# Patient Record
Sex: FEMALE | Race: WHITE | Employment: OTHER | URBAN - METROPOLITAN AREA
[De-identification: names, ages, dates, MRNs, and addresses within clinical notes are randomized per-mention and may not be internally consistent; named-entity substitution may affect disease eponyms.]

---

## 2017-07-25 PROBLEM — K57.92 DIVERTICULITIS: Status: ACTIVE | Noted: 2017-07-25

## 2017-07-25 PROBLEM — M79.7 FIBROMYALGIA: Status: ACTIVE | Noted: 2017-07-25

## 2017-07-25 PROBLEM — K44.9 HIATAL HERNIA: Status: ACTIVE | Noted: 2017-07-25

## 2019-09-16 ENCOUNTER — HOSPITAL ENCOUNTER (OUTPATIENT)
Age: 64
Discharge: HOME | End: 2019-09-18

## 2019-09-16 PROCEDURE — 88307 TISSUE EXAM BY PATHOLOGIST: CPT

## 2020-03-02 ENCOUNTER — APPOINTMENT (OUTPATIENT)
Dept: CT IMAGING | Age: 65
End: 2020-03-02
Payer: MEDICARE

## 2020-03-02 ENCOUNTER — HOSPITAL ENCOUNTER (EMERGENCY)
Age: 65
Discharge: HOME | End: 2020-03-02
Attending: EMERGENCY MEDICINE
Payer: MEDICARE

## 2020-03-02 VITALS
HEIGHT: 62 IN | TEMPERATURE: 98.2 F | SYSTOLIC BLOOD PRESSURE: 145 MMHG | OXYGEN SATURATION: 93 % | HEART RATE: 92 BPM | WEIGHT: 166 LBS | DIASTOLIC BLOOD PRESSURE: 66 MMHG | BODY MASS INDEX: 30.55 KG/M2 | RESPIRATION RATE: 16 BRPM

## 2020-03-02 LAB
ALBUMIN SERPL-MCNC: 4.6 G/DL (ref 3.5–5.2)
ALP BLD-CCNC: 115 U/L (ref 35–104)
ALT SERPL-CCNC: 24 U/L (ref 0–32)
ANION GAP SERPL CALCULATED.3IONS-SCNC: 15 MMOL/L (ref 7–16)
AST SERPL-CCNC: 21 U/L (ref 0–31)
BASOPHILS ABSOLUTE: 0.05 E9/L (ref 0–0.2)
BASOPHILS RELATIVE PERCENT: 0.5 % (ref 0–2)
BILIRUB SERPL-MCNC: 0.5 MG/DL (ref 0–1.2)
BUN BLDV-MCNC: 11 MG/DL (ref 8–23)
CALCIUM SERPL-MCNC: 9.5 MG/DL (ref 8.6–10.2)
CHLORIDE BLD-SCNC: 104 MMOL/L (ref 98–107)
CO2: 21 MMOL/L (ref 22–29)
CREAT SERPL-MCNC: 0.8 MG/DL (ref 0.5–1)
EOSINOPHILS ABSOLUTE: 0.01 E9/L (ref 0.05–0.5)
EOSINOPHILS RELATIVE PERCENT: 0.1 % (ref 0–6)
GFR AFRICAN AMERICAN: >60
GFR NON-AFRICAN AMERICAN: >60 ML/MIN/1.73
GLUCOSE BLD-MCNC: 123 MG/DL (ref 74–99)
HCT VFR BLD CALC: 46.6 % (ref 34–48)
HEMOGLOBIN: 14.9 G/DL (ref 11.5–15.5)
IMMATURE GRANULOCYTES #: 0.04 E9/L
IMMATURE GRANULOCYTES %: 0.4 % (ref 0–5)
LACTIC ACID: 1.2 MMOL/L (ref 0.5–2.2)
LYMPHOCYTES ABSOLUTE: 1.09 E9/L (ref 1.5–4)
LYMPHOCYTES RELATIVE PERCENT: 10.2 % (ref 20–42)
MCH RBC QN AUTO: 26.3 PG (ref 26–35)
MCHC RBC AUTO-ENTMCNC: 32 % (ref 32–34.5)
MCV RBC AUTO: 82.2 FL (ref 80–99.9)
MONOCYTES ABSOLUTE: 0.51 E9/L (ref 0.1–0.95)
MONOCYTES RELATIVE PERCENT: 4.8 % (ref 2–12)
NEUTROPHILS ABSOLUTE: 9 E9/L (ref 1.8–7.3)
NEUTROPHILS RELATIVE PERCENT: 84 % (ref 43–80)
PDW BLD-RTO: 14.8 FL (ref 11.5–15)
PLATELET # BLD: 351 E9/L (ref 130–450)
PMV BLD AUTO: 10.3 FL (ref 7–12)
POTASSIUM SERPL-SCNC: 4.1 MMOL/L (ref 3.5–5)
RBC # BLD: 5.67 E12/L (ref 3.5–5.5)
SEDIMENTATION RATE, ERYTHROCYTE: 7 MM/HR (ref 0–20)
SODIUM BLD-SCNC: 140 MMOL/L (ref 132–146)
TOTAL PROTEIN: 8.2 G/DL (ref 6.4–8.3)
WBC # BLD: 10.7 E9/L (ref 4.5–11.5)

## 2020-03-02 PROCEDURE — 96372 THER/PROPH/DIAG INJ SC/IM: CPT

## 2020-03-02 PROCEDURE — 85651 RBC SED RATE NONAUTOMATED: CPT

## 2020-03-02 PROCEDURE — 70450 CT HEAD/BRAIN W/O DYE: CPT

## 2020-03-02 PROCEDURE — 96374 THER/PROPH/DIAG INJ IV PUSH: CPT

## 2020-03-02 PROCEDURE — 6370000000 HC RX 637 (ALT 250 FOR IP): Performed by: PHYSICIAN ASSISTANT

## 2020-03-02 PROCEDURE — 96375 TX/PRO/DX INJ NEW DRUG ADDON: CPT

## 2020-03-02 PROCEDURE — 70496 CT ANGIOGRAPHY HEAD: CPT

## 2020-03-02 PROCEDURE — 83605 ASSAY OF LACTIC ACID: CPT

## 2020-03-02 PROCEDURE — 99284 EMERGENCY DEPT VISIT MOD MDM: CPT

## 2020-03-02 PROCEDURE — 85025 COMPLETE CBC W/AUTO DIFF WBC: CPT

## 2020-03-02 PROCEDURE — 6360000002 HC RX W HCPCS: Performed by: PHYSICIAN ASSISTANT

## 2020-03-02 PROCEDURE — 6360000004 HC RX CONTRAST MEDICATION: Performed by: RADIOLOGY

## 2020-03-02 PROCEDURE — 80053 COMPREHEN METABOLIC PANEL: CPT

## 2020-03-02 RX ORDER — METOCLOPRAMIDE HYDROCHLORIDE 5 MG/ML
10 INJECTION INTRAMUSCULAR; INTRAVENOUS ONCE
Status: COMPLETED | OUTPATIENT
Start: 2020-03-02 | End: 2020-03-02

## 2020-03-02 RX ORDER — HYDROCODONE BITARTRATE AND ACETAMINOPHEN 5; 325 MG/1; MG/1
1 TABLET ORAL ONCE
Status: COMPLETED | OUTPATIENT
Start: 2020-03-02 | End: 2020-03-02

## 2020-03-02 RX ORDER — DIPHENHYDRAMINE HYDROCHLORIDE 50 MG/ML
25 INJECTION INTRAMUSCULAR; INTRAVENOUS ONCE
Status: COMPLETED | OUTPATIENT
Start: 2020-03-02 | End: 2020-03-02

## 2020-03-02 RX ORDER — DEXAMETHASONE SODIUM PHOSPHATE 10 MG/ML
10 INJECTION INTRAMUSCULAR; INTRAVENOUS ONCE
Status: COMPLETED | OUTPATIENT
Start: 2020-03-02 | End: 2020-03-02

## 2020-03-02 RX ORDER — ACETAMINOPHEN 500 MG
1000 TABLET ORAL ONCE
Status: COMPLETED | OUTPATIENT
Start: 2020-03-02 | End: 2020-03-02

## 2020-03-02 RX ORDER — PREDNISONE 20 MG/1
40 TABLET ORAL DAILY
Qty: 10 TABLET | Refills: 0 | Status: ON HOLD | OUTPATIENT
Start: 2020-03-02 | End: 2020-03-11 | Stop reason: HOSPADM

## 2020-03-02 RX ORDER — OXCARBAZEPINE 150 MG/1
150 TABLET, FILM COATED ORAL 2 TIMES DAILY
Qty: 60 TABLET | Refills: 3 | Status: ON HOLD | OUTPATIENT
Start: 2020-03-02 | End: 2020-03-11 | Stop reason: SDUPTHER

## 2020-03-02 RX ORDER — HYDROCODONE BITARTRATE AND ACETAMINOPHEN 5; 325 MG/1; MG/1
1 TABLET ORAL EVERY 6 HOURS PRN
Qty: 12 TABLET | Refills: 0 | Status: ON HOLD | OUTPATIENT
Start: 2020-03-02 | End: 2020-03-11 | Stop reason: HOSPADM

## 2020-03-02 RX ADMIN — ACETAMINOPHEN 1000 MG: 500 TABLET ORAL at 12:43

## 2020-03-02 RX ADMIN — IOPAMIDOL 80 ML: 755 INJECTION, SOLUTION INTRAVENOUS at 15:11

## 2020-03-02 RX ADMIN — DEXAMETHASONE SODIUM PHOSPHATE 10 MG: 10 INJECTION, SOLUTION INTRAMUSCULAR; INTRAVENOUS at 14:46

## 2020-03-02 RX ADMIN — HYDROCODONE BITARTRATE AND ACETAMINOPHEN 1 TABLET: 5; 325 TABLET ORAL at 16:27

## 2020-03-02 RX ADMIN — DIPHENHYDRAMINE HYDROCHLORIDE 25 MG: 50 INJECTION, SOLUTION INTRAMUSCULAR; INTRAVENOUS at 13:35

## 2020-03-02 RX ADMIN — METOCLOPRAMIDE 10 MG: 5 INJECTION, SOLUTION INTRAMUSCULAR; INTRAVENOUS at 13:35

## 2020-03-02 ASSESSMENT — PAIN SCALES - GENERAL
PAINLEVEL_OUTOF10: 10
PAINLEVEL_OUTOF10: 5
PAINLEVEL_OUTOF10: 7
PAINLEVEL_OUTOF10: 5

## 2020-03-02 ASSESSMENT — PAIN - FUNCTIONAL ASSESSMENT: PAIN_FUNCTIONAL_ASSESSMENT: PREVENTS OR INTERFERES SOME ACTIVE ACTIVITIES AND ADLS

## 2020-03-02 ASSESSMENT — PAIN DESCRIPTION - PAIN TYPE
TYPE: ACUTE PAIN

## 2020-03-02 ASSESSMENT — PAIN DESCRIPTION - LOCATION
LOCATION: HEAD;EYE
LOCATION: EYE;HEAD
LOCATION: EYE;HEAD
LOCATION: HEAD;EYE

## 2020-03-02 ASSESSMENT — PAIN DESCRIPTION - ORIENTATION
ORIENTATION: RIGHT

## 2020-03-02 ASSESSMENT — PAIN DESCRIPTION - FREQUENCY: FREQUENCY: CONTINUOUS

## 2020-03-02 ASSESSMENT — PAIN DESCRIPTION - DESCRIPTORS: DESCRIPTORS: OTHER (COMMENT)

## 2020-03-02 NOTE — ED PROVIDER NOTES
ED Attending  CC: Ximena       Department of Emergency Medicine   ED  Provider Note  Admit Date/RoomTime: 3/2/2020 12:44 PM  ED Room: 15/15   Chief Complaint:   Headache (Started late friday \"like an electric shock\" that comes and goes. States this morning unable to open eyes. Recent tx for possible sinus infection.  )    History of Present Illness   Source of history provided by:  patient. History/Exam Limitations: none. Nate Remy is a 72 y.o. old female who has a past medical hx of:   Past Medical History:   Diagnosis Date    Chronic fatigue     Brooklynn Montgomery infection     Fibromyalgia     Hiatal hernia     presents to the emergency department by private vehicle, for complaint of sided headache intermittently for the past couple days. Patient describes it as sharp, stabbing and states it is above her right eyebrow. Patient states it lasted a few seconds-minutes. Pt states she pain is making her be unable to open her eyes. Patient is being treated for a sinus infection with Augmentin right now. Denies fever/chills, vision change, dizziness, chest pain, dyspnea, abdominal pain, NVD, numbness/weakness. ROS    Pertinent positives and negatives are stated within HPI, all other systems reviewed and are negative. Past Surgical History:   Procedure Laterality Date    LEEP  09/16/2019    WISDOM TOOTH EXTRACTION     Social History:  reports that she has never smoked. She has never used smokeless tobacco. She reports that she does not drink alcohol or use drugs. Family History: family history includes Breast Cancer in her maternal aunt and maternal grandmother; Colon Cancer in her paternal grandmother; [place] in her maternal grandfather; Other in her mother.    Allergies: Sulfa antibiotics    Physical Exam           ED Triage Vitals [03/02/20 1226]   BP Temp Temp Source Pulse Resp SpO2 Height Weight   (!) 142/86 98.2 °F (36.8 °C) Oral 104 16 94 % 5' 2\" (1.575 m) 166 lb (75.3 kg)    Oxygen Saturation

## 2020-03-02 NOTE — ED NOTES
FIRST PROVIDER CONTACT ASSESSMENT NOTE      Department of Emergency Medicine   ED  First Provider Note   3/2/20  12:30 PM    Chief Complaint: Headache (Started late [de-identified] \"like an electric shock\" that comes and goes. States this morning unable to open eyes. Recent tx for possible sinus infection.  )      History of Present Illness:    Lj Quiroz is a 72 y.o. female who presents to the ED by private car for headache. Patient states she is having electric shock above her right eye that comes and goes. Patient states she is unable to open her eyes with some drainage. Patient has history of sinus affection for which she is been on antibiotics. Patient states she went to her doctor this morning who told her to come right here. Patient is also been taking Sudafed. Patient states she is tried Advil for the pain. Focused Screening Exam:  Constitutional:  Alert, appears stated age and is in no distress.       *ALLERGIES*     Sulfa antibiotics     ED Triage Vitals [03/02/20 1226]   BP Temp Temp Source Pulse Resp SpO2 Height Weight   (!) 142/86 98.2 °F (36.8 °C) Oral 104 16 94 % 5' 2\" (1.575 m) 166 lb (75.3 kg)        Initial Plan of Care:  Initiate Treatment-Testing, Proceed toTreatment Area When Bed Available for ED Attending/MLP to Continue Care    -----------------END OF FIRST PROVIDER CONTACT ASSESSMENT NOTE--------------  Electronically signed by Heather Galarza PA-C   DD: 3/2/20       Heather Galarza PA-C  03/02/20 6699

## 2020-03-04 ENCOUNTER — HOSPITAL ENCOUNTER (INPATIENT)
Age: 65
LOS: 6 days | Discharge: HOME | DRG: 074 | End: 2020-03-11
Attending: EMERGENCY MEDICINE | Admitting: INTERNAL MEDICINE
Payer: MEDICARE

## 2020-03-04 PROBLEM — K44.9 HIATAL HERNIA: Status: RESOLVED | Noted: 2017-07-25 | Resolved: 2020-03-04

## 2020-03-04 PROBLEM — M79.2 NEURALGIA AND NEURITIS: Status: ACTIVE | Noted: 2020-03-04

## 2020-03-04 PROBLEM — G50.0 TRIGEMINAL NEURALGIA PAIN: Status: ACTIVE | Noted: 2020-03-04

## 2020-03-04 PROBLEM — G50.9 TRIGEMINAL NEUROPATHY: Status: ACTIVE | Noted: 2020-03-04

## 2020-03-04 PROBLEM — G50.9 TRIGEMINAL NEUROPATHY: Status: RESOLVED | Noted: 2020-03-04 | Resolved: 2020-03-04

## 2020-03-04 PROBLEM — R51.9 HEADACHE: Status: ACTIVE | Noted: 2020-03-04

## 2020-03-04 LAB
ALBUMIN SERPL-MCNC: 4.4 G/DL (ref 3.5–5.2)
ALP BLD-CCNC: 102 U/L (ref 35–104)
ALT SERPL-CCNC: 20 U/L (ref 0–32)
ANION GAP SERPL CALCULATED.3IONS-SCNC: 14 MMOL/L (ref 7–16)
AST SERPL-CCNC: 20 U/L (ref 0–31)
BILIRUB SERPL-MCNC: 0.3 MG/DL (ref 0–1.2)
BUN BLDV-MCNC: 16 MG/DL (ref 8–23)
CALCIUM SERPL-MCNC: 9.4 MG/DL (ref 8.6–10.2)
CHLORIDE BLD-SCNC: 104 MMOL/L (ref 98–107)
CO2: 22 MMOL/L (ref 22–29)
CREAT SERPL-MCNC: 0.7 MG/DL (ref 0.5–1)
GFR AFRICAN AMERICAN: >60
GFR NON-AFRICAN AMERICAN: >60 ML/MIN/1.73
GLUCOSE BLD-MCNC: 147 MG/DL (ref 74–99)
HCT VFR BLD CALC: 46.1 % (ref 34–48)
HEMOGLOBIN: 14.7 G/DL (ref 11.5–15.5)
MCH RBC QN AUTO: 25.8 PG (ref 26–35)
MCHC RBC AUTO-ENTMCNC: 31.9 % (ref 32–34.5)
MCV RBC AUTO: 80.9 FL (ref 80–99.9)
PDW BLD-RTO: 14.9 FL (ref 11.5–15)
PLATELET # BLD: 400 E9/L (ref 130–450)
PMV BLD AUTO: 10.2 FL (ref 7–12)
POTASSIUM SERPL-SCNC: 4.2 MMOL/L (ref 3.5–5)
RBC # BLD: 5.7 E12/L (ref 3.5–5.5)
SEDIMENTATION RATE, ERYTHROCYTE: 15 MM/HR (ref 0–20)
SODIUM BLD-SCNC: 140 MMOL/L (ref 132–146)
TOTAL PROTEIN: 7.7 G/DL (ref 6.4–8.3)
WBC # BLD: 12.9 E9/L (ref 4.5–11.5)

## 2020-03-04 PROCEDURE — 96374 THER/PROPH/DIAG INJ IV PUSH: CPT

## 2020-03-04 PROCEDURE — 6360000002 HC RX W HCPCS: Performed by: EMERGENCY MEDICINE

## 2020-03-04 PROCEDURE — 85027 COMPLETE CBC AUTOMATED: CPT

## 2020-03-04 PROCEDURE — G0378 HOSPITAL OBSERVATION PER HR: HCPCS

## 2020-03-04 PROCEDURE — 85651 RBC SED RATE NONAUTOMATED: CPT

## 2020-03-04 PROCEDURE — 99284 EMERGENCY DEPT VISIT MOD MDM: CPT

## 2020-03-04 PROCEDURE — 96375 TX/PRO/DX INJ NEW DRUG ADDON: CPT

## 2020-03-04 PROCEDURE — 80053 COMPREHEN METABOLIC PANEL: CPT

## 2020-03-04 RX ORDER — MORPHINE SULFATE 2 MG/ML
2 INJECTION, SOLUTION INTRAMUSCULAR; INTRAVENOUS EVERY 4 HOURS PRN
Status: DISCONTINUED | OUTPATIENT
Start: 2020-03-04 | End: 2020-03-05

## 2020-03-04 RX ORDER — ONDANSETRON 2 MG/ML
4 INJECTION INTRAMUSCULAR; INTRAVENOUS ONCE
Status: COMPLETED | OUTPATIENT
Start: 2020-03-04 | End: 2020-03-04

## 2020-03-04 RX ORDER — POLYETHYLENE GLYCOL 3350 17 G/17G
17 POWDER, FOR SOLUTION ORAL DAILY PRN
Status: DISCONTINUED | OUTPATIENT
Start: 2020-03-04 | End: 2020-03-11 | Stop reason: HOSPADM

## 2020-03-04 RX ORDER — HYDROCODONE BITARTRATE AND ACETAMINOPHEN 5; 325 MG/1; MG/1
1 TABLET ORAL EVERY 6 HOURS PRN
Status: DISCONTINUED | OUTPATIENT
Start: 2020-03-04 | End: 2020-03-05

## 2020-03-04 RX ORDER — ONDANSETRON 2 MG/ML
4 INJECTION INTRAMUSCULAR; INTRAVENOUS EVERY 6 HOURS PRN
Status: DISCONTINUED | OUTPATIENT
Start: 2020-03-04 | End: 2020-03-11 | Stop reason: HOSPADM

## 2020-03-04 RX ORDER — AMOXICILLIN 500 MG/1
500 CAPSULE ORAL 2 TIMES DAILY
Status: ON HOLD | COMMUNITY
End: 2020-03-11 | Stop reason: HOSPADM

## 2020-03-04 RX ORDER — OXCARBAZEPINE 300 MG/1
150 TABLET, FILM COATED ORAL 2 TIMES DAILY
Status: DISCONTINUED | OUTPATIENT
Start: 2020-03-04 | End: 2020-03-05

## 2020-03-04 RX ORDER — ACETAMINOPHEN 325 MG/1
650 TABLET ORAL EVERY 6 HOURS PRN
Status: DISCONTINUED | OUTPATIENT
Start: 2020-03-04 | End: 2020-03-11 | Stop reason: HOSPADM

## 2020-03-04 RX ORDER — PANTOPRAZOLE SODIUM 40 MG/1
40 TABLET, DELAYED RELEASE ORAL
Status: DISCONTINUED | OUTPATIENT
Start: 2020-03-05 | End: 2020-03-05 | Stop reason: SDUPTHER

## 2020-03-04 RX ORDER — METHYLPREDNISOLONE SODIUM SUCCINATE 40 MG/ML
40 INJECTION, POWDER, LYOPHILIZED, FOR SOLUTION INTRAMUSCULAR; INTRAVENOUS ONCE
Status: COMPLETED | OUTPATIENT
Start: 2020-03-04 | End: 2020-03-05

## 2020-03-04 RX ORDER — SODIUM CHLORIDE 0.9 % (FLUSH) 0.9 %
10 SYRINGE (ML) INJECTION EVERY 12 HOURS SCHEDULED
Status: DISCONTINUED | OUTPATIENT
Start: 2020-03-04 | End: 2020-03-11 | Stop reason: HOSPADM

## 2020-03-04 RX ORDER — PROMETHAZINE HYDROCHLORIDE 25 MG/1
12.5 TABLET ORAL EVERY 6 HOURS PRN
Status: DISCONTINUED | OUTPATIENT
Start: 2020-03-04 | End: 2020-03-10

## 2020-03-04 RX ORDER — SODIUM CHLORIDE 0.9 % (FLUSH) 0.9 %
10 SYRINGE (ML) INJECTION PRN
Status: DISCONTINUED | OUTPATIENT
Start: 2020-03-04 | End: 2020-03-11 | Stop reason: HOSPADM

## 2020-03-04 RX ORDER — ACETAMINOPHEN 650 MG/1
650 SUPPOSITORY RECTAL EVERY 6 HOURS PRN
Status: DISCONTINUED | OUTPATIENT
Start: 2020-03-04 | End: 2020-03-11 | Stop reason: HOSPADM

## 2020-03-04 RX ADMIN — ONDANSETRON HYDROCHLORIDE 4 MG: 2 SOLUTION INTRAMUSCULAR; INTRAVENOUS at 20:17

## 2020-03-04 RX ADMIN — HYDROMORPHONE HYDROCHLORIDE 1 MG: 1 INJECTION, SOLUTION INTRAMUSCULAR; INTRAVENOUS; SUBCUTANEOUS at 20:17

## 2020-03-04 ASSESSMENT — PAIN SCALES - GENERAL
PAINLEVEL_OUTOF10: 5
PAINLEVEL_OUTOF10: 7
PAINLEVEL_OUTOF10: 6

## 2020-03-04 ASSESSMENT — PAIN DESCRIPTION - FREQUENCY: FREQUENCY: CONTINUOUS

## 2020-03-04 ASSESSMENT — PAIN DESCRIPTION - LOCATION: LOCATION: HEAD;EYE

## 2020-03-04 ASSESSMENT — ENCOUNTER SYMPTOMS
COUGH: 0
BLOOD IN STOOL: 0
TROUBLE SWALLOWING: 0
CHEST TIGHTNESS: 0
VOMITING: 0
BACK PAIN: 0
NAUSEA: 0
RHINORRHEA: 0
DIARRHEA: 0
WHEEZING: 0
SHORTNESS OF BREATH: 0
EYE PAIN: 0
ABDOMINAL PAIN: 0

## 2020-03-04 ASSESSMENT — PAIN DESCRIPTION - PROGRESSION: CLINICAL_PROGRESSION: NOT CHANGED

## 2020-03-04 ASSESSMENT — PAIN DESCRIPTION - DESCRIPTORS: DESCRIPTORS: OTHER (COMMENT)

## 2020-03-04 ASSESSMENT — PAIN DESCRIPTION - PAIN TYPE: TYPE: ACUTE PAIN

## 2020-03-04 ASSESSMENT — PAIN - FUNCTIONAL ASSESSMENT: PAIN_FUNCTIONAL_ASSESSMENT: PREVENTS OR INTERFERES SOME ACTIVE ACTIVITIES AND ADLS

## 2020-03-04 ASSESSMENT — PAIN DESCRIPTION - ORIENTATION: ORIENTATION: RIGHT

## 2020-03-04 ASSESSMENT — PAIN DESCRIPTION - ONSET: ONSET: ON-GOING

## 2020-03-05 PROBLEM — G50.0 TRIGEMINAL NEURALGIA: Status: ACTIVE | Noted: 2020-03-05

## 2020-03-05 LAB
BACTERIA: ABNORMAL /HPF
BILIRUBIN URINE: NEGATIVE
BLOOD, URINE: NEGATIVE
C-REACTIVE PROTEIN: 0.3 MG/DL (ref 0–0.4)
CLARITY: CLEAR
COLOR: YELLOW
GLUCOSE URINE: NEGATIVE MG/DL
KETONES, URINE: ABNORMAL MG/DL
LEUKOCYTE ESTERASE, URINE: NEGATIVE
NITRITE, URINE: NEGATIVE
PH UA: 6 (ref 5–9)
PROTEIN UA: NEGATIVE MG/DL
RBC UA: ABNORMAL /HPF (ref 0–2)
SPECIFIC GRAVITY UA: 1.02 (ref 1–1.03)
UROBILINOGEN, URINE: 0.2 E.U./DL
WBC UA: ABNORMAL /HPF (ref 0–5)

## 2020-03-05 PROCEDURE — 86140 C-REACTIVE PROTEIN: CPT

## 2020-03-05 PROCEDURE — 6360000002 HC RX W HCPCS: Performed by: INTERNAL MEDICINE

## 2020-03-05 PROCEDURE — 2580000003 HC RX 258: Performed by: INTERNAL MEDICINE

## 2020-03-05 PROCEDURE — 99221 1ST HOSP IP/OBS SF/LOW 40: CPT | Performed by: PSYCHIATRY & NEUROLOGY

## 2020-03-05 PROCEDURE — 6370000000 HC RX 637 (ALT 250 FOR IP): Performed by: INTERNAL MEDICINE

## 2020-03-05 PROCEDURE — 6370000000 HC RX 637 (ALT 250 FOR IP): Performed by: NURSE PRACTITIONER

## 2020-03-05 PROCEDURE — 81001 URINALYSIS AUTO W/SCOPE: CPT

## 2020-03-05 PROCEDURE — 96372 THER/PROPH/DIAG INJ SC/IM: CPT

## 2020-03-05 PROCEDURE — 96376 TX/PRO/DX INJ SAME DRUG ADON: CPT

## 2020-03-05 PROCEDURE — 6360000002 HC RX W HCPCS: Performed by: NURSE PRACTITIONER

## 2020-03-05 PROCEDURE — 86038 ANTINUCLEAR ANTIBODIES: CPT

## 2020-03-05 PROCEDURE — 2500000003 HC RX 250 WO HCPCS: Performed by: INTERNAL MEDICINE

## 2020-03-05 PROCEDURE — 2580000003 HC RX 258: Performed by: NURSE PRACTITIONER

## 2020-03-05 PROCEDURE — 96375 TX/PRO/DX INJ NEW DRUG ADDON: CPT

## 2020-03-05 PROCEDURE — 1200000000 HC SEMI PRIVATE

## 2020-03-05 PROCEDURE — 36415 COLL VENOUS BLD VENIPUNCTURE: CPT

## 2020-03-05 RX ORDER — BACLOFEN 10 MG/1
20 TABLET ORAL 3 TIMES DAILY
Status: DISCONTINUED | OUTPATIENT
Start: 2020-03-05 | End: 2020-03-09

## 2020-03-05 RX ORDER — OXCARBAZEPINE 300 MG/1
300 TABLET, FILM COATED ORAL 2 TIMES DAILY
Status: DISCONTINUED | OUTPATIENT
Start: 2020-03-05 | End: 2020-03-06

## 2020-03-05 RX ORDER — MORPHINE SULFATE 4 MG/ML
4 INJECTION, SOLUTION INTRAMUSCULAR; INTRAVENOUS
Status: DISCONTINUED | OUTPATIENT
Start: 2020-03-05 | End: 2020-03-05

## 2020-03-05 RX ORDER — OXYCODONE HCL 10 MG/1
10 TABLET, FILM COATED, EXTENDED RELEASE ORAL EVERY 8 HOURS
Status: DISCONTINUED | OUTPATIENT
Start: 2020-03-05 | End: 2020-03-05

## 2020-03-05 RX ORDER — TRAMADOL HYDROCHLORIDE 50 MG/1
50 TABLET ORAL EVERY 6 HOURS PRN
Status: DISCONTINUED | OUTPATIENT
Start: 2020-03-05 | End: 2020-03-05

## 2020-03-05 RX ORDER — SODIUM CHLORIDE, SODIUM LACTATE, POTASSIUM CHLORIDE, CALCIUM CHLORIDE 600; 310; 30; 20 MG/100ML; MG/100ML; MG/100ML; MG/100ML
INJECTION, SOLUTION INTRAVENOUS CONTINUOUS
Status: DISCONTINUED | OUTPATIENT
Start: 2020-03-05 | End: 2020-03-09

## 2020-03-05 RX ORDER — DEXLANSOPRAZOLE 60 MG/1
60 CAPSULE, DELAYED RELEASE ORAL DAILY
Status: DISCONTINUED | OUTPATIENT
Start: 2020-03-05 | End: 2020-03-11 | Stop reason: HOSPADM

## 2020-03-05 RX ORDER — TRAMADOL HYDROCHLORIDE 50 MG/1
50 TABLET ORAL EVERY 6 HOURS PRN
Status: DISCONTINUED | OUTPATIENT
Start: 2020-03-05 | End: 2020-03-07

## 2020-03-05 RX ORDER — DIPHENHYDRAMINE HCL 25 MG
25 TABLET ORAL ONCE
Status: COMPLETED | OUTPATIENT
Start: 2020-03-05 | End: 2020-03-05

## 2020-03-05 RX ORDER — KETOROLAC TROMETHAMINE 30 MG/ML
15 INJECTION, SOLUTION INTRAMUSCULAR; INTRAVENOUS EVERY 6 HOURS
Status: DISCONTINUED | OUTPATIENT
Start: 2020-03-05 | End: 2020-03-09

## 2020-03-05 RX ADMIN — OXCARBAZEPINE 300 MG: 300 TABLET, FILM COATED ORAL at 20:36

## 2020-03-05 RX ADMIN — Medication 10 ML: at 08:44

## 2020-03-05 RX ADMIN — VALPROATE SODIUM 500 MG: 100 INJECTION, SOLUTION INTRAVENOUS at 20:34

## 2020-03-05 RX ADMIN — OXCARBAZEPINE 150 MG: 300 TABLET, FILM COATED ORAL at 00:30

## 2020-03-05 RX ADMIN — SODIUM CHLORIDE, POTASSIUM CHLORIDE, SODIUM LACTATE AND CALCIUM CHLORIDE: 600; 310; 30; 20 INJECTION, SOLUTION INTRAVENOUS at 10:47

## 2020-03-05 RX ADMIN — SODIUM CHLORIDE 1000 MG: 9 INJECTION, SOLUTION INTRAVENOUS at 17:27

## 2020-03-05 RX ADMIN — OXYCODONE HYDROCHLORIDE 10 MG: 10 TABLET, FILM COATED, EXTENDED RELEASE ORAL at 10:43

## 2020-03-05 RX ADMIN — METHYLPREDNISOLONE SODIUM SUCCINATE 40 MG: 40 INJECTION, POWDER, FOR SOLUTION INTRAMUSCULAR; INTRAVENOUS at 00:31

## 2020-03-05 RX ADMIN — DIPHENHYDRAMINE HCL 25 MG: 25 TABLET ORAL at 00:31

## 2020-03-05 RX ADMIN — Medication 10 ML: at 00:31

## 2020-03-05 RX ADMIN — KETOROLAC TROMETHAMINE 15 MG: 30 INJECTION, SOLUTION INTRAMUSCULAR; INTRAVENOUS at 10:51

## 2020-03-05 RX ADMIN — Medication 10 ML: at 20:36

## 2020-03-05 RX ADMIN — KETOROLAC TROMETHAMINE 15 MG: 30 INJECTION, SOLUTION INTRAMUSCULAR; INTRAVENOUS at 22:51

## 2020-03-05 RX ADMIN — KETOROLAC TROMETHAMINE 15 MG: 30 INJECTION, SOLUTION INTRAMUSCULAR; INTRAVENOUS at 17:16

## 2020-03-05 RX ADMIN — HYDROCODONE BITARTRATE AND ACETAMINOPHEN 1 TABLET: 5; 325 TABLET ORAL at 07:36

## 2020-03-05 RX ADMIN — BACLOFEN 20 MG: 10 TABLET ORAL at 20:36

## 2020-03-05 RX ADMIN — MORPHINE SULFATE 4 MG: 4 INJECTION, SOLUTION INTRAMUSCULAR; INTRAVENOUS at 10:43

## 2020-03-05 RX ADMIN — BACLOFEN 20 MG: 10 TABLET ORAL at 10:51

## 2020-03-05 RX ADMIN — DEXLANSOPRAZOLE 60 MG: 60 CAPSULE, DELAYED RELEASE ORAL at 08:44

## 2020-03-05 RX ADMIN — OXCARBAZEPINE 150 MG: 300 TABLET, FILM COATED ORAL at 08:45

## 2020-03-05 RX ADMIN — BACLOFEN 20 MG: 10 TABLET ORAL at 15:05

## 2020-03-05 RX ADMIN — SODIUM CHLORIDE, POTASSIUM CHLORIDE, SODIUM LACTATE AND CALCIUM CHLORIDE: 600; 310; 30; 20 INJECTION, SOLUTION INTRAVENOUS at 22:51

## 2020-03-05 ASSESSMENT — PAIN DESCRIPTION - DESCRIPTORS: DESCRIPTORS: OTHER (COMMENT)

## 2020-03-05 ASSESSMENT — ENCOUNTER SYMPTOMS
VOMITING: 0
WHEEZING: 0
NAUSEA: 0
COUGH: 0
BLOOD IN STOOL: 0
CONSTIPATION: 0
DIARRHEA: 0
RHINORRHEA: 0
SINUS PAIN: 0
STRIDOR: 0
SHORTNESS OF BREATH: 0
ABDOMINAL PAIN: 0

## 2020-03-05 ASSESSMENT — PAIN SCALES - GENERAL
PAINLEVEL_OUTOF10: 0
PAINLEVEL_OUTOF10: 6
PAINLEVEL_OUTOF10: 10
PAINLEVEL_OUTOF10: 10
PAINLEVEL_OUTOF10: 0
PAINLEVEL_OUTOF10: 10

## 2020-03-05 ASSESSMENT — PAIN DESCRIPTION - PAIN TYPE
TYPE: ACUTE PAIN
TYPE: ACUTE PAIN
TYPE: CHRONIC PAIN

## 2020-03-05 ASSESSMENT — PAIN DESCRIPTION - ORIENTATION: ORIENTATION: RIGHT

## 2020-03-05 ASSESSMENT — PAIN DESCRIPTION - LOCATION: LOCATION: HEAD

## 2020-03-05 ASSESSMENT — PAIN DESCRIPTION - FREQUENCY: FREQUENCY: CONTINUOUS

## 2020-03-05 ASSESSMENT — PAIN DESCRIPTION - ONSET: ONSET: ON-GOING

## 2020-03-05 ASSESSMENT — PAIN DESCRIPTION - PROGRESSION: CLINICAL_PROGRESSION: NOT CHANGED

## 2020-03-05 ASSESSMENT — PAIN - FUNCTIONAL ASSESSMENT: PAIN_FUNCTIONAL_ASSESSMENT: PREVENTS OR INTERFERES WITH MANY ACTIVE NOT PASSIVE ACTIVITIES

## 2020-03-05 NOTE — ED PROVIDER NOTES
[address] [facility]  eMERGENCY dEPARTMENT eNCOUnter      Pt Name: Shailesh Marques  MRN: [id number]  [place] 1955  Date of evaluation: 3/4/2020  Provider: MD Sapna Moreira       Chief Complaint   Patient presents with    Facial Pain     pt reports unable to open eyes, feels electric shock . dx with trigemianl neuralgia         HISTORY OF PRESENT ILLNESS   (Location/Symptom, Timing/Onset,Context/Setting, Quality, Duration, Modifying Factors, Severity) Note limiting factors. Patient presents here with severe head and facial pain. Patient states last Wednesday she had an eye exam.  She did not have any problems with that. On Thursday and Friday started having intermittent sharp stabbing pains in the right side of her head. On Saturday they began progressively more severe to the point where she had to be seen in [facility] emergency department. At that time she had a CT as well as a CTA which were negative. It was felt that she had trigeminal neuralgia. She was placed on Trileptal prednisone and Vicodin. She has not had any improvement in fact is worse. She states she cannot even open her eyes because open her eyes seems to trigger it. She has been sitting with her eyes closed for 2 days. She states she has not eaten anything but applesauce. She denies any fevers chills nausea or vomiting. She is never really had anything like this consistently in the past but she states every once in a while for the last several years she would get one episode of a sharp zinging pain in her head but would only last a few seconds. Most of these pains last just seconds but sometimes they last a little longer. She has not had any other associated symptoms.   She is very difficult to examine and get a history from because she is extremely uncomfortable and crying          Shailesh Marques is a 72 y.o. female who presents to the emergency department      Nursing needed for Pain for up to 3 days. OXCARBAZEPINE (TRILEPTAL) 150 MG TABLET    Take 1 tablet by mouth 2 times daily    PREDNISONE (DELTASONE) 20 MG TABLET    Take 2 tablets by mouth daily for 5 days       ALLERGIES     Sulfa antibiotics    FAMILY HISTORY       Family History   Problem Relation Age of Onset    Breast Cancer Maternal Aunt     Breast Cancer Maternal Grandmother     Lung Cancer Maternal Grandfather     Other Mother         ASHD    Colon Cancer Paternal Grandmother     Uterine Cancer Neg Hx     Cervical Cancer Neg Hx           SOCIAL HISTORY       Social History     Socioeconomic History    Marital status:       Spouse name: None    Number of children: None    Years of education: None    Highest education level: None   Occupational History    None   Social Needs    Financial resource strain: None    Food insecurity:     Worry: None     Inability: None    Transportation needs:     Medical: None     Non-medical: None   Tobacco Use    Smoking status: Never Smoker    Smokeless tobacco: Never Used   Substance and Sexual Activity    Alcohol use: No    Drug use: No    Sexual activity: Not Currently     Partners: Male   Lifestyle    Physical activity:     Days per week: None     Minutes per session: None    Stress: None   Relationships    Social connections:     Talks on phone: None     Gets together: None     Attends [employer] service: None     Active member of club or organization: None     Attends meetings of clubs or organizations: None     Relationship status: None    Intimate partner violence:     Fear of current or ex partner: None     Emotionally abused: None     Physically abused: None     Forced sexual activity: None   Other Topics Concern    None   Social History Narrative    None       SCREENINGS      @FLOW([id number])@    PHYSICAL EXAM    (5+ for level 4, 8+ for level 5)     ED Triage Vitals   BP Temp Temp src Pulse Resp SpO2 Height Weight   03/04/20 1624 -- -- normal.         DIAGNOSTIC RESULTS     EKG (Per Emergency Physician):       RADIOLOGY (Per Hanh Chen): Interpretation per the Radiologist below, if available at the time of this note:  No results found. LABS:  Labs Reviewed   CBC - Abnormal; Notable for the following components:       Result Value    WBC 12.9 (*)     RBC 5.70 (*)     MCH 25.8 (*)     MCHC 31.9 (*)     All other components within normal limits   COMPREHENSIVE METABOLIC PANEL - Abnormal; Notable for the following components:    Glucose 147 (*)     All other components within normal limits   SEDIMENTATION RATE   URINALYSIS WITH MICROSCOPIC       All other labs were within normal range or not returned as of this dictation. EMERGENCY DEPARTMENT COURSE and DIFFERENTIALDIAGNOSIS/MDM:   Vitals:    Vitals:    03/04/20 1614 03/04/20 1624 03/04/20 1900 03/04/20 2017   BP:  (!) 150/80 (!) 165/84 (!) 142/63   Pulse: 101  88 83   Resp: 18  20 20   SpO2: 94%  94% 93%       Medications   HYDROmorphone (DILAUDID) injection 1 mg (1 mg Intravenous Given 3/4/20 2017)   ondansetron (ZOFRAN) injection 4 mg (4 mg Intravenous Given 3/4/20 2017)       MDM  Number of Diagnoses or Management Options  Diagnosis management comments: Patient presents here with progressive head pain. This seems to be more of a neuralgia. The patient had devious work-up. Labs were obtained. I do not feel that ongoing be able to contain her pain here. I feel that she will need further evaluation and possible some sort of pain management protocol. Also will need seen by neurology. I spoke with Lukas Haywood covering for Dr. Jm Jacques and the patient will be admitted to observation.   Discussed this with patient and family and they are in agreement       Amount and/or Complexity of Data Reviewed  Clinical lab tests: ordered and reviewed  Decide to obtain previous medical records or to obtain history from someone other than the patient: yes  Obtain history from someone other than

## 2020-03-05 NOTE — CONSULTS
Elma Cox is a 72 y.o. female       Chief Complaint   Patient presents with    Facial Pain     pt reports unable to open eyes, feels electric shock . dx with trigemianl neuralgia       HPI:  This is a 29-year-old female with a PMHx of fibromyalgia, right TMJ times. She initially presented to [facility] ED 2 days ago with complaints of intermittent right-sided facial pain and severe headaches. The facial pain was described as sharp with a sensation of electric shocks occurring above her right eye. Was started on Augmentin and Sudafed by her PCP for concerns of sinusitis. While in the ED CT head without contrast and CTA head were both negative for any acute intracranial processes. She was discharged on prednisone 40 mg daily, Trileptal 150 mg twice daily, and Norco.  The patient symptoms continue to progressively worsen with pain now becoming bilateral (r>>l), constant and inability to open eyes due to limitation from pain. As a result she presented to [facility] ED yesterday for further evaluation. She denied fever, chills, vision changes or jaw claudication. VS and labs were overall unremarkable. (WBC 12.9 likely secondary to steroids)  Consulted for further evaluation of trigeminal neuralgia. HPI  Prior to Visit Medications    Medication Sig Taking? Authorizing Provider   amoxicillin (AMOXIL) 500 MG capsule Take 500 mg by mouth 2 times daily Yes Historical Provider, MD   OXcarbazepine (TRILEPTAL) 150 MG tablet Take 1 tablet by mouth 2 times daily Yes Maria Antonia Degroot PA-C   HYDROcodone-acetaminophen (NORCO) 5-325 MG per tablet Take 1 tablet by mouth every 6 hours as needed for Pain for up to 3 days.  Yes Maira Antonia Degroot PA-C   predniSONE (DELTASONE) 20 MG tablet Take 2 tablets by mouth daily for 5 days Yes Maria Antonia Degroot PA-C   dexlansoprazole (DEXILANT) 60 MG CPDR delayed release capsule Take 60 mg by mouth daily Yes Historical Provider, MD   ergocalciferol (ERGOCALCIFEROL) 1.25 MG (24960 UT) capsule Take 50,000 Units by mouth once a week  Historical Provider, MD   cyanocobalamin 1000 MCG tablet Take 1,000 mcg by mouth daily  Historical Provider, MD     Social History     Tobacco Use    Smoking status: Never Smoker    Smokeless tobacco: Never Used   Substance Use Topics    Alcohol use: No    Drug use: No     Family History   Problem Relation Age of Onset    Breast Cancer Maternal Aunt     Breast Cancer Maternal Grandmother     Lung Cancer Maternal Grandfather     Other Mother         ASHD    Colon Cancer Paternal Grandmother     Uterine Cancer Neg Hx     Cervical Cancer Neg Hx      Past Surgical History:   Procedure Laterality Date    LEEP  09/16/2019    WISDOM TOOTH EXTRACTION       Past Medical History:   Diagnosis Date    Chronic fatigue     Brooklynn Motngomery infection     Fibromyalgia     Hiatal hernia      Review of Systems   Constitutional: Negative for chills, fatigue and fever. HENT: Negative for congestion, rhinorrhea and sinus pain. Respiratory: Negative for cough, shortness of breath, wheezing and stridor. Cardiovascular: Negative for chest pain, palpitations and leg swelling. Gastrointestinal: Negative for abdominal pain, blood in stool, constipation, diarrhea, nausea and vomiting. Genitourinary: Negative for dysuria, frequency and hematuria. Musculoskeletal: Negative for gait problem and myalgias. Skin: Negative for rash. Allergic/Immunologic: Negative for environmental allergies. Neurological: Negative for dizziness, tremors, seizures, syncope, speech difficulty, weakness, numbness and headaches. Sharp shooting pain in right face   Hematological: Does not bruise/bleed easily. Psychiatric/Behavioral: Negative for suicidal ideas. Objective:   /67   Pulse 92   Temp 98.5 °F (36.9 °C) (Oral)   Resp 18   Wt 166 lb 0.1 oz (75.3 kg)   SpO2 92%   BMI 30.36 kg/m²     Physical Exam  Constitutional:       General: She is in acute distress. 5.70 03/04/2020    HGB 14.7 03/04/2020    HCT 46.1 03/04/2020     03/04/2020    MCV 80.9 03/04/2020    MCH 25.8 03/04/2020    MCHC 31.9 03/04/2020    RDW 14.9 03/04/2020    LYMPHOPCT 10.2 03/02/2020    MONOPCT 4.8 03/02/2020    BASOPCT 0.5 03/02/2020    MONOSABS 0.51 03/02/2020    LYMPHSABS 1.09 03/02/2020    EOSABS 0.01 03/02/2020    BASOSABS 0.05 03/02/2020     CMP:    Lab Results   Component Value Date     03/04/2020    K 4.2 03/04/2020     03/04/2020    CO2 22 03/04/2020    BUN 16 03/04/2020    CREATININE 0.7 03/04/2020    GFRAA >60 03/04/2020    LABGLOM >60 03/04/2020    GLUCOSE 147 03/04/2020    PROT 7.7 03/04/2020    LABALBU 4.4 03/04/2020    CALCIUM 9.4 03/04/2020    BILITOT 0.3 03/04/2020    ALKPHOS 102 03/04/2020    AST 20 03/04/2020    ALT 20 03/04/2020     Magnesium:  No results found for: MG  Phosphorus:  No results found for: PHOS  TSH:  No results found for: TSH    CT head WO contrast and CTA head with contrast: negative for any acute intracranial process    I independently reviewed the labs and imaging studies at today's appointment. Assessment:     Acute right sided trigeminal neuralgia    Patient Active Problem List   Diagnosis    Diverticulitis    Fibromyalgia    Headache    Trigeminal neuralgia pain    Neuralgia and neuritis       Plan:     · Currently on trleptal 300 mg bid, oxycodone, baclofen and toradol   · Will add on pulse dose solumederol 1000 mg daily x 2 days, depakote 500 mg q 12 hrly x 2 days  · Follow-up CRP, ANNI  · All other problems per primary      Luzma Soares M.D. Internal Medicine Resident, PGY 2    Attending physician: Dr. Hawa Livingston    On 2/25/2020 she had checkup up on her eyes by ophthalmologist.  Her eyes were dilated. Couple of days after she started to have some face pain mostly on the right side. It increased little by little over the weekend.   She presented to outside hospital with severe pain mostly on the right side of the face in V1 and V2 but also on the left side more on V1 but overall the left side is less prominent than the right side. The pain got so severe that she was not able to even remove her glasses. Also she kept her eyes closed as the secretion from her eyes dried out and she was not willing to touch her eyelids to open them as that would have caused a lot of pain. The pain that she describes is severe electrical shock that comes and goes. Few days ago it was constant. Today after getting several medications the pains are much less. With a lot of effort I asked her to open her eyes and her pupils are 2 mm and reactive to light bilaterally. She did not have any signs of conjunctivitis. However she had significant dry and semi-dry secretions. I encouraged her to try to keep her eyes open as best as she can. Neither she nor me are able to touch her face as even subtle touch could set off significant volly of pain attacks. She also feels the pain in the scalp in front of her vertex. No facial asymmetry is seen. Otherwise neurological examination is completely normal.    The most likely diagnosis is trigeminal neuralgia. However I would like to reexamine her especially in her eyes to make sure that she does not have any local orbital problems such as conjunctivitis or scratching her cornea which seems likely as she does not have redness in her eye. So far she was getting Toradol, tramadol, oxycodone and morphine. I discontinued oxycodone and morphine. Toradol can be given as the first step and if the pain continues despite Toradol she can be on tramadol. Few days ago she presented to outside hospital and was a started on oxcarbazepine 150 mg twice daily. It did not help her so from today it was increased to 300 mg twice daily. Also she was a started on baclofen which she gets significant relief of pain afterwards. We will continue oxcarbazepine and baclofen as they are.     I put her on Solu-Medrol 1000 mg daily for 2 days and Depakote 500 mg twice daily for 2 days. After she is more comfortable she should have brain MRI without and with gadolinium to look for any central nervous system inflammation or compression on the course of 5th nerve.     Mikhail Barth MD

## 2020-03-05 NOTE — H&P
[address] [facility]  History and Physical      CHIEF COMPLAINT:  Headache / face pain    History of Present Illness: For the last several days the patient has been experiencing horrific sharp stabbing electricity-like pain on the upper half of her face, bilaterally but right greater than left. It is constant but has paroxysms of excruciating pain that seem to occur with no discernible pattern or provoking factors. No recent facial procedures or dental work. This has never happened to her in the past.  She was diagnosed with possible trigeminal neuralgia in the emergency room a few days ago and was discharged with Trileptal, but this did not help and so she came back to the hospital again due to unremitting pain. The pain is so severe that is limiting her ability to eat and drink. Past Medical History:   Diagnosis Date    Chronic fatigue     Brooklynn Montgomery infection     Fibromyalgia     Hiatal hernia          Past Surgical History:   Procedure Laterality Date    LEEP  09/16/2019    WISDOM TOOTH EXTRACTION         Medications Prior to Admission:    Medications Prior to Admission: amoxicillin (AMOXIL) 500 MG capsule, Take 500 mg by mouth 2 times daily  OXcarbazepine (TRILEPTAL) 150 MG tablet, Take 1 tablet by mouth 2 times daily  HYDROcodone-acetaminophen (NORCO) 5-325 MG per tablet, Take 1 tablet by mouth every 6 hours as needed for Pain for up to 3 days.   predniSONE (DELTASONE) 20 MG tablet, Take 2 tablets by mouth daily for 5 days  dexlansoprazole (DEXILANT) 60 MG CPDR delayed release capsule, Take 60 mg by mouth daily  ergocalciferol (ERGOCALCIFEROL) 1.25 MG (42350 UT) capsule, Take 50,000 Units by mouth once a week  cyanocobalamin 1000 MCG tablet, Take 1,000 mcg by mouth daily    Note that the patient's home medications were reviewed and the above list is accurate to the best of my knowledge at the time of the exam.    Allergies:    Sulfa antibiotics    Social History:    reports that Headache    Neuralgia and neuritis  Resolved Problems:    * No resolved hospital problems. *    Symptoms are mostly compatible with trigeminal neuralgia, although it seems more bilateral than I would expect. In any case it is strongly suggestive of neuropathic pain. ESR is only 15 which makes giant cell arteritis very unlikely. Obtain CRP as well. If CRP is very elevated, will empirically add steroids. Neurology has been consulted. I will double her Trileptal dose, add baclofen around-the-clock, additionally will add OxyContin 10 mg 3 times daily scheduled, toradol scheduled, and morphine as needed. She is so uncomfortable that I will switch her to PCA pump if we are unable to get her pain under control in a reasonable amount of time.     Start IV fluids, as she is having difficulty eating and drinking due to pain    Code status: FULL CODE  Requires continued inpatient level of care due to unremitting pain, refractory to outpatient management, requiring IV opioids  Birgit Moses    10:05 AM  3/5/2020  Cell: [phone]

## 2020-03-05 NOTE — ED NOTES
Bed: 06  Expected date:   Expected time:   Means of arrival:   Comments:  Triage      Franco Robertson, HAILEE  03/04/20 1924

## 2020-03-05 NOTE — CARE COORDINATION
Transition of care at discharge: Patient is unwilling to talk at this time due to extreme head pain. Being medicated currenlty. Will follow.

## 2020-03-06 ENCOUNTER — APPOINTMENT (OUTPATIENT)
Dept: MRI IMAGING | Age: 65
DRG: 074 | End: 2020-03-06
Payer: MEDICARE

## 2020-03-06 PROBLEM — M79.2 NEURALGIA AND NEURITIS: Status: RESOLVED | Noted: 2020-03-04 | Resolved: 2020-03-06

## 2020-03-06 PROBLEM — G50.0 TRIGEMINAL NEURALGIA PAIN: Status: RESOLVED | Noted: 2020-03-04 | Resolved: 2020-03-06

## 2020-03-06 LAB — ANTI-NUCLEAR ANTIBODY (ANA): NEGATIVE

## 2020-03-06 PROCEDURE — A9579 GAD-BASE MR CONTRAST NOS,1ML: HCPCS | Performed by: RADIOLOGY

## 2020-03-06 PROCEDURE — 1200000000 HC SEMI PRIVATE

## 2020-03-06 PROCEDURE — 6360000002 HC RX W HCPCS: Performed by: NURSE PRACTITIONER

## 2020-03-06 PROCEDURE — 2500000003 HC RX 250 WO HCPCS: Performed by: INTERNAL MEDICINE

## 2020-03-06 PROCEDURE — 6360000002 HC RX W HCPCS: Performed by: INTERNAL MEDICINE

## 2020-03-06 PROCEDURE — 6370000000 HC RX 637 (ALT 250 FOR IP): Performed by: NURSE PRACTITIONER

## 2020-03-06 PROCEDURE — 2580000003 HC RX 258: Performed by: INTERNAL MEDICINE

## 2020-03-06 PROCEDURE — 70553 MRI BRAIN STEM W/O & W/DYE: CPT

## 2020-03-06 PROCEDURE — 6370000000 HC RX 637 (ALT 250 FOR IP): Performed by: INTERNAL MEDICINE

## 2020-03-06 PROCEDURE — 6360000004 HC RX CONTRAST MEDICATION: Performed by: RADIOLOGY

## 2020-03-06 PROCEDURE — 99233 SBSQ HOSP IP/OBS HIGH 50: CPT | Performed by: NURSE PRACTITIONER

## 2020-03-06 RX ORDER — OXCARBAZEPINE 150 MG/1
450 TABLET, FILM COATED ORAL 2 TIMES DAILY
Status: DISCONTINUED | OUTPATIENT
Start: 2020-03-08 | End: 2020-03-11 | Stop reason: HOSPADM

## 2020-03-06 RX ORDER — LEVETIRACETAM 10 MG/ML
1000 INJECTION INTRAVASCULAR EVERY 12 HOURS
Status: COMPLETED | OUTPATIENT
Start: 2020-03-06 | End: 2020-03-08

## 2020-03-06 RX ORDER — OXCARBAZEPINE 300 MG/1
300 TABLET, FILM COATED ORAL 2 TIMES DAILY
Status: COMPLETED | OUTPATIENT
Start: 2020-03-06 | End: 2020-03-07

## 2020-03-06 RX ORDER — NALOXONE HYDROCHLORIDE 0.4 MG/ML
0.4 INJECTION, SOLUTION INTRAMUSCULAR; INTRAVENOUS; SUBCUTANEOUS PRN
Status: DISCONTINUED | OUTPATIENT
Start: 2020-03-06 | End: 2020-03-08

## 2020-03-06 RX ORDER — MORPHINE SULFATE/0.9% NACL/PF 1 MG/ML
SYRINGE (ML) INJECTION CONTINUOUS
Status: DISCONTINUED | OUTPATIENT
Start: 2020-03-06 | End: 2020-03-08

## 2020-03-06 RX ORDER — MORPHINE SULFATE 4 MG/ML
4 INJECTION, SOLUTION INTRAMUSCULAR; INTRAVENOUS
Status: DISCONTINUED | OUTPATIENT
Start: 2020-03-06 | End: 2020-03-06

## 2020-03-06 RX ADMIN — BACLOFEN 20 MG: 10 TABLET ORAL at 20:38

## 2020-03-06 RX ADMIN — MORPHINE SULFATE: 1 INJECTION INTRAVENOUS at 13:16

## 2020-03-06 RX ADMIN — OXCARBAZEPINE 300 MG: 300 TABLET, FILM COATED ORAL at 15:18

## 2020-03-06 RX ADMIN — VALPROATE SODIUM 500 MG: 100 INJECTION, SOLUTION INTRAVENOUS at 18:49

## 2020-03-06 RX ADMIN — SODIUM CHLORIDE 1000 MG: 9 INJECTION, SOLUTION INTRAVENOUS at 08:15

## 2020-03-06 RX ADMIN — VALPROATE SODIUM 500 MG: 100 INJECTION, SOLUTION INTRAVENOUS at 05:17

## 2020-03-06 RX ADMIN — BACLOFEN 20 MG: 10 TABLET ORAL at 15:18

## 2020-03-06 RX ADMIN — KETOROLAC TROMETHAMINE 15 MG: 30 INJECTION, SOLUTION INTRAMUSCULAR; INTRAVENOUS at 05:17

## 2020-03-06 RX ADMIN — ONDANSETRON 4 MG: 2 INJECTION INTRAMUSCULAR; INTRAVENOUS at 08:17

## 2020-03-06 RX ADMIN — OXCARBAZEPINE 300 MG: 300 TABLET, FILM COATED ORAL at 08:15

## 2020-03-06 RX ADMIN — KETOROLAC TROMETHAMINE 15 MG: 30 INJECTION, SOLUTION INTRAMUSCULAR; INTRAVENOUS at 18:12

## 2020-03-06 RX ADMIN — BACLOFEN 20 MG: 10 TABLET ORAL at 08:14

## 2020-03-06 RX ADMIN — ONDANSETRON 4 MG: 2 INJECTION INTRAMUSCULAR; INTRAVENOUS at 16:30

## 2020-03-06 RX ADMIN — LEVETIRACETAM 1000 MG: 10 INJECTION INTRAVENOUS at 16:14

## 2020-03-06 RX ADMIN — KETOROLAC TROMETHAMINE 15 MG: 30 INJECTION, SOLUTION INTRAMUSCULAR; INTRAVENOUS at 11:48

## 2020-03-06 RX ADMIN — DEXLANSOPRAZOLE 60 MG: 60 CAPSULE, DELAYED RELEASE ORAL at 08:15

## 2020-03-06 RX ADMIN — OXCARBAZEPINE 300 MG: 300 TABLET, FILM COATED ORAL at 20:38

## 2020-03-06 RX ADMIN — GADOTERIDOL 15 ML: 279.3 INJECTION, SOLUTION INTRAVENOUS at 20:16

## 2020-03-06 ASSESSMENT — PAIN SCALES - GENERAL
PAINLEVEL_OUTOF10: 8
PAINLEVEL_OUTOF10: 9
PAINLEVEL_OUTOF10: 4
PAINLEVEL_OUTOF10: 0
PAINLEVEL_OUTOF10: 9

## 2020-03-06 NOTE — PROGRESS NOTES
Melony Maxwell is a 72 y.o.  female     Neurology is following for trigeminal neuralgia    PMH: Brooklynn-Barr, fibromyalgia, chronic fatigue, R TMJ    The patient presented to [facility] on 3/3 with right-sided, electrical shock-like facial pains above R eye--and severe headaches--initially diagnosed with sinusitis. Pains did not respond to atb, prednisone, initial dose of Trileptal, or [place] as an outpatient. Initial contrasted CT and CTAs were unrevealing. Pain progressed and became bilateral, with R>L and she became unable to open her eyes due to the severity. She is on IV Toradol and completed 2 days of IV Solu-Medrol 1000 mg and 2 days of IV Depacon 500 mg. She is also on Trileptal 300 mg twice daily which was increased on 3/5--and baclofen 20 mg 3 times daily. Primary put her on a morphine PCA pump this morning d/t excruciating, intolerable \"bolts of lightning\" pains in the right side of her face. She is sitting in a dark room, afraid to move and does not permit me to touch her face or examine her at the present timed/t fear of triggering her pain. Sed and CRP were normal.  She has difficulty opening her right eye, but denies any vision loss. No facial swelling or conjunctival injection. No other autonomic signs and symptoms.     No chest pain or palpitations  No SOB  No vertigo, lightheadedness or loss of consciousness  No incontinence of bowels or bladder  No itching or bruising appreciated  No numbness, tingling or focal arm/leg weakness    ROS otherwise negative     Current Facility-Administered Medications   Medication Dose Route Frequency Provider Last Rate Last Dose    morphine sulfate (PF) injection 4 mg  4 mg Intravenous Q2H PRN Francoise Ortiz MD        naloxone [facility]) injection 0.4 mg  0.4 mg Intravenous PRN Francoise Ortiz MD        morphine PCA 1 mg/mL   Intravenous Continuous Birgit Jenkins MD        dexlansoprazole [facility]) delayed release capsule 60 mg  60 mg Oral Daily Marlene Young nonlabored    Mental Status: Alert, oriented x4    Appropriate attention/concentration  Intact fundus of knowledge  Intact memories    Speech: no dysarthria  Language: no aphasias    Cranial Nerves:  I: smell NA   II: visual acuity  NA   II: visual fields    II: pupils    III,VII: ptosis  keeps eyes closed   III,IV,VI: extraocular muscles     V: mastication    V: facial light touch sensation     V,VII: corneal reflex     VII: facial muscle function - upper     VII: facial muscle function - lower symmetric   VIII: hearing Normal   IX: soft palate elevation  Normal   IX,X: gag reflex    XI: trapezius strength     XI: sternocleidomastoid strength    XI: neck extension strength     XII: tongue strength       Motor:  Moving all limbs independenty    Coordination:   No ataxic movements noted    DTR:   Deferred due to pain    Laboratory/Radiology:     Sed and CRP negative    All labs and images personally reviewed today    Assessment:     Severe refractory trigeminal neuralgia--vs ??hemicrania continuaa   No response to high-dose steroids, VPA, and current oxcarb and baclofen doses   CT and CTAs unrevealing but must eval for compressive lesions on facial nerve   No obvious autonomic s/s and sed/CRP normal     Plan:     Trial of IV Keppra 1G BID x days    Continue Trileptal 300 mg BID for 2 more days then increase to 450 mg BID    Continue baclofen 20 mg TID    Consider Indomethacin trial if the above fails    MRI brain WWO when pt can tolerate    Narcotics/PCA pump not ideal for this patient and hopefully can be stopped soon    Discussed with Dr. Monica Li    Will follow    JAVI Russell-CNP  1:00 PM  3/6/2020

## 2020-03-06 NOTE — PROGRESS NOTES
Subjective:    Patient had some modest temporary response to baclofen, but this morning she is practically in tears with severe pain. Describes as \"electricity\" in face, R > L, V1 distribution bilaterally. Objective:    BP (!) 173/77   Pulse 93   Temp 98.8 °F (37.1 °C) (Temporal)   Resp 16   Wt 166 lb 0.1 oz (75.3 kg)   SpO2 93%   BMI 30.36 kg/m²     Current medications that patient is taking have been reviewed. General appearance: Very uncomfortable appearing female sitting in darkened room, eyes closed, head down  HEENT: AT/NC, MMM  Neck: FROM, supple  Lungs: Clear to auscultation  CV: RRR, no MRGs  Abdomen: Soft, non-tender; no masses or HSM, +BS  Extremities: No peripheral edema or digital cyanosis  Skin: no rash, lesions or ulcers  Psych: Calm and cooperative  Neuro: Alert and interactive, nonfocal but cannot touch her face    Labs:  CBC:   Lab Results   Component Value Date    WBC 12.9 03/04/2020    RBC 5.70 03/04/2020    HGB 14.7 03/04/2020    HCT 46.1 03/04/2020    MCV 80.9 03/04/2020    MCH 25.8 03/04/2020    MCHC 31.9 03/04/2020    RDW 14.9 03/04/2020     03/04/2020    MPV 10.2 03/04/2020     BMP:    Lab Results   Component Value Date     03/04/2020    K 4.2 03/04/2020     03/04/2020    CO2 22 03/04/2020    BUN 16 03/04/2020    LABALBU 4.4 03/04/2020    CREATININE 0.7 03/04/2020    CALCIUM 9.4 03/04/2020    GFRAA >60 03/04/2020    LABGLOM >60 03/04/2020    GLUCOSE 147 03/04/2020      Assessment/Plan:    Principal Problem:    Trigeminal neuralgia pain  Active Problems:    Fibromyalgia    Headache    Neuralgia and neuritis    Trigeminal neuralgia  Resolved Problems:    * No resolved hospital problems. *    Greatly appreciate Neuro assistance  Continue neuropathic-directed medications including baclofen, trileptal, steroids. Continue toradol  She is in too much pain and I don't think it will get better immediately.   Will start PCA while we wait for the neuro meds to kick

## 2020-03-07 LAB
ANION GAP SERPL CALCULATED.3IONS-SCNC: 13 MMOL/L (ref 7–16)
BUN BLDV-MCNC: 24 MG/DL (ref 8–23)
CALCIUM SERPL-MCNC: 8.8 MG/DL (ref 8.6–10.2)
CHLORIDE BLD-SCNC: 101 MMOL/L (ref 98–107)
CO2: 26 MMOL/L (ref 22–29)
CREAT SERPL-MCNC: 0.8 MG/DL (ref 0.5–1)
GFR AFRICAN AMERICAN: >60
GFR NON-AFRICAN AMERICAN: >60 ML/MIN/1.73
GLUCOSE BLD-MCNC: 133 MG/DL (ref 74–99)
POTASSIUM SERPL-SCNC: 4.3 MMOL/L (ref 3.5–5)
SODIUM BLD-SCNC: 140 MMOL/L (ref 132–146)

## 2020-03-07 PROCEDURE — 99233 SBSQ HOSP IP/OBS HIGH 50: CPT | Performed by: CLINICAL NURSE SPECIALIST

## 2020-03-07 PROCEDURE — 6370000000 HC RX 637 (ALT 250 FOR IP): Performed by: NURSE PRACTITIONER

## 2020-03-07 PROCEDURE — 1200000000 HC SEMI PRIVATE

## 2020-03-07 PROCEDURE — 36415 COLL VENOUS BLD VENIPUNCTURE: CPT

## 2020-03-07 PROCEDURE — 6370000000 HC RX 637 (ALT 250 FOR IP): Performed by: INTERNAL MEDICINE

## 2020-03-07 PROCEDURE — 2580000003 HC RX 258: Performed by: INTERNAL MEDICINE

## 2020-03-07 PROCEDURE — 80048 BASIC METABOLIC PNL TOTAL CA: CPT

## 2020-03-07 PROCEDURE — 2500000003 HC RX 250 WO HCPCS: Performed by: INTERNAL MEDICINE

## 2020-03-07 PROCEDURE — 6360000002 HC RX W HCPCS: Performed by: INTERNAL MEDICINE

## 2020-03-07 PROCEDURE — 6360000002 HC RX W HCPCS: Performed by: NURSE PRACTITIONER

## 2020-03-07 PROCEDURE — 94770 HC ETCO2 MONITOR DAILY: CPT

## 2020-03-07 RX ADMIN — BACLOFEN 20 MG: 10 TABLET ORAL at 09:57

## 2020-03-07 RX ADMIN — BACLOFEN 20 MG: 10 TABLET ORAL at 14:28

## 2020-03-07 RX ADMIN — OXCARBAZEPINE 300 MG: 300 TABLET, FILM COATED ORAL at 09:57

## 2020-03-07 RX ADMIN — VALPROATE SODIUM 500 MG: 100 INJECTION, SOLUTION INTRAVENOUS at 05:59

## 2020-03-07 RX ADMIN — OXCARBAZEPINE 300 MG: 300 TABLET, FILM COATED ORAL at 19:57

## 2020-03-07 RX ADMIN — BACLOFEN 20 MG: 10 TABLET ORAL at 19:56

## 2020-03-07 RX ADMIN — KETOROLAC TROMETHAMINE 15 MG: 30 INJECTION, SOLUTION INTRAMUSCULAR; INTRAVENOUS at 23:34

## 2020-03-07 RX ADMIN — LEVETIRACETAM 1000 MG: 10 INJECTION INTRAVENOUS at 14:28

## 2020-03-07 RX ADMIN — KETOROLAC TROMETHAMINE 15 MG: 30 INJECTION, SOLUTION INTRAMUSCULAR; INTRAVENOUS at 18:24

## 2020-03-07 RX ADMIN — DEXLANSOPRAZOLE 60 MG: 60 CAPSULE, DELAYED RELEASE ORAL at 09:57

## 2020-03-07 RX ADMIN — LEVETIRACETAM 1000 MG: 10 INJECTION INTRAVENOUS at 02:10

## 2020-03-07 RX ADMIN — KETOROLAC TROMETHAMINE 15 MG: 30 INJECTION, SOLUTION INTRAMUSCULAR; INTRAVENOUS at 05:59

## 2020-03-07 ASSESSMENT — PAIN SCALES - GENERAL
PAINLEVEL_OUTOF10: 7
PAINLEVEL_OUTOF10: 2
PAINLEVEL_OUTOF10: 0
PAINLEVEL_OUTOF10: 0

## 2020-03-07 NOTE — PROGRESS NOTES
Dane Edouard is a 72 y.o.  female     Neurology is following for trigeminal neuralgia    PMH: Brooklynn-Barr, fibromyalgia, chronic fatigue, R TMJ    The patient presented to [facility] on 3/3 with right-sided, electrical shock-like facial pains above R eye--and severe headaches--initially diagnosed with sinusitis. Pains did not respond to atb, prednisone, initial dose of Trileptal, or [place] as an outpatient. Initial contrasted CT and CTAs were unrevealing. Pain progressed and became bilateral, with R>L and she became unable to open her eyes due to the severity. She is on IV Toradol and completed 2 days of IV Solu-Medrol 1000 mg and 2 days of IV Depacon 500 mg. She is also on Trileptal 300 mg twice daily which was increased on 3/5--and baclofen 20 mg 3 times daily. Trileptal is scheduled to increase tomorrow to 450mg BID    Primary put her on a morphine PCA pump this morning d/t excruciating, intolerable \"bolts of lightning\" pains in the right side of her face.      She has not needed MS04 since yesterday morning    Admits to be getting better    Keppra started as well -- due to stop tomorrow     Sed and CRP were normal.      Now opening eyes  Eating per family    patient with NC secure but not on -- \"I am scared I will need it so its ready\"    No chest pain or palpitations  No SOB  No vertigo, lightheadedness or loss of consciousness  No incontinence of bowels or bladder  No itching or bruising appreciated  No numbness, tingling or focal arm/leg weakness    ROS otherwise negative     Objective:     BP (!) 140/63   Pulse 69   Temp 99.3 °F (37.4 °C)   Resp 15   Ht 5' 2\" (1.575 m)   Wt 166 lb 0.1 oz (75.3 kg)   SpO2 94%   BMI 30.36 kg/m²      Exam limited due to pts fear of eliciting her head pains    General appearance: alert, appears stated age--sitting up in bed in dark room on PCA pump  Head: Eyes closed-there is no obvious facial swelling   Lungs: Respirations nonlabored    Mental Status: Alert, oriented

## 2020-03-08 PROCEDURE — 6360000002 HC RX W HCPCS: Performed by: INTERNAL MEDICINE

## 2020-03-08 PROCEDURE — 6360000002 HC RX W HCPCS: Performed by: NURSE PRACTITIONER

## 2020-03-08 PROCEDURE — 1200000000 HC SEMI PRIVATE

## 2020-03-08 PROCEDURE — 2580000003 HC RX 258: Performed by: NURSE PRACTITIONER

## 2020-03-08 PROCEDURE — 94770 HC ETCO2 MONITOR DAILY: CPT

## 2020-03-08 PROCEDURE — 6370000000 HC RX 637 (ALT 250 FOR IP): Performed by: NURSE PRACTITIONER

## 2020-03-08 PROCEDURE — 6370000000 HC RX 637 (ALT 250 FOR IP): Performed by: INTERNAL MEDICINE

## 2020-03-08 PROCEDURE — 2700000000 HC OXYGEN THERAPY PER DAY

## 2020-03-08 RX ORDER — HYDROCODONE BITARTRATE AND ACETAMINOPHEN 5; 325 MG/1; MG/1
1 TABLET ORAL EVERY 6 HOURS PRN
Status: DISCONTINUED | OUTPATIENT
Start: 2020-03-08 | End: 2020-03-11 | Stop reason: HOSPADM

## 2020-03-08 RX ADMIN — KETOROLAC TROMETHAMINE 15 MG: 30 INJECTION, SOLUTION INTRAMUSCULAR; INTRAVENOUS at 23:35

## 2020-03-08 RX ADMIN — SODIUM CHLORIDE, PRESERVATIVE FREE 10 ML: 5 INJECTION INTRAVENOUS at 17:16

## 2020-03-08 RX ADMIN — BACLOFEN 20 MG: 10 TABLET ORAL at 14:04

## 2020-03-08 RX ADMIN — LEVETIRACETAM 1000 MG: 10 INJECTION INTRAVENOUS at 03:30

## 2020-03-08 RX ADMIN — OXCARBAZEPINE 450 MG: 150 TABLET, FILM COATED ORAL at 09:05

## 2020-03-08 RX ADMIN — KETOROLAC TROMETHAMINE 15 MG: 30 INJECTION, SOLUTION INTRAMUSCULAR; INTRAVENOUS at 12:24

## 2020-03-08 RX ADMIN — DEXLANSOPRAZOLE 60 MG: 60 CAPSULE, DELAYED RELEASE ORAL at 09:05

## 2020-03-08 RX ADMIN — KETOROLAC TROMETHAMINE 15 MG: 30 INJECTION, SOLUTION INTRAMUSCULAR; INTRAVENOUS at 05:37

## 2020-03-08 RX ADMIN — OXCARBAZEPINE 450 MG: 150 TABLET, FILM COATED ORAL at 20:37

## 2020-03-08 RX ADMIN — Medication 10 ML: at 20:40

## 2020-03-08 RX ADMIN — ONDANSETRON 4 MG: 2 INJECTION INTRAMUSCULAR; INTRAVENOUS at 09:05

## 2020-03-08 RX ADMIN — Medication 10 ML: at 09:06

## 2020-03-08 RX ADMIN — KETOROLAC TROMETHAMINE 15 MG: 30 INJECTION, SOLUTION INTRAMUSCULAR; INTRAVENOUS at 17:16

## 2020-03-08 RX ADMIN — BACLOFEN 20 MG: 10 TABLET ORAL at 09:06

## 2020-03-08 ASSESSMENT — PAIN DESCRIPTION - PROGRESSION
CLINICAL_PROGRESSION: NOT CHANGED
CLINICAL_PROGRESSION: GRADUALLY IMPROVING

## 2020-03-08 ASSESSMENT — PAIN DESCRIPTION - FREQUENCY
FREQUENCY: CONTINUOUS
FREQUENCY: CONTINUOUS

## 2020-03-08 ASSESSMENT — PAIN SCALES - GENERAL
PAINLEVEL_OUTOF10: 0
PAINLEVEL_OUTOF10: 0
PAINLEVEL_OUTOF10: 5
PAINLEVEL_OUTOF10: 5

## 2020-03-08 ASSESSMENT — PAIN DESCRIPTION - PAIN TYPE
TYPE: ACUTE PAIN
TYPE: ACUTE PAIN

## 2020-03-08 ASSESSMENT — PAIN DESCRIPTION - LOCATION
LOCATION: HEAD
LOCATION: HEAD

## 2020-03-08 ASSESSMENT — PAIN DESCRIPTION - ONSET
ONSET: ON-GOING
ONSET: ON-GOING

## 2020-03-08 ASSESSMENT — PAIN - FUNCTIONAL ASSESSMENT
PAIN_FUNCTIONAL_ASSESSMENT: PREVENTS OR INTERFERES SOME ACTIVE ACTIVITIES AND ADLS
PAIN_FUNCTIONAL_ASSESSMENT: PREVENTS OR INTERFERES SOME ACTIVE ACTIVITIES AND ADLS

## 2020-03-08 ASSESSMENT — PAIN DESCRIPTION - DESCRIPTORS
DESCRIPTORS: ACHING;CONSTANT;DISCOMFORT
DESCRIPTORS: ACHING;CONSTANT;DISCOMFORT

## 2020-03-08 ASSESSMENT — PAIN DESCRIPTION - ORIENTATION
ORIENTATION: RIGHT
ORIENTATION: RIGHT

## 2020-03-08 NOTE — PROGRESS NOTES
Subjective:  States pain control but sleepy does not want more pain meds. No CP or SOB  No fever or chills   No uncontrolled pain  No vomiting or diarrhea   Family at bedside all questions answered   Objective:    /84   Pulse 66   Temp 98.2 °F (36.8 °C) (Oral)   Resp 16   Ht 5' 2\" (1.575 m)   Wt 166 lb 0.1 oz (75.3 kg)   SpO2 94%   BMI 30.36 kg/m²     24HR INTAKE/OUTPUT:    No intake or output data in the 24 hours ending 03/08/20 1512  nad  Heart:  RRR, no murmurs, gallops, or rubs. Lungs:  CTA bilaterally, no wheeze, rales or rhonchi  Abd: bowel sounds present, nontender, nondistended, no masses  Extrem:  No clubbing, cyanosis, or edema    Most Recent Labs  Lab Results   Component Value Date    WBC 12.9 (H) 03/04/2020    HGB 14.7 03/04/2020    HCT 46.1 03/04/2020     03/04/2020     03/07/2020    K 4.3 03/07/2020     03/07/2020    CREATININE 0.8 03/07/2020    BUN 24 (H) 03/07/2020    CO2 26 03/07/2020    GLUCOSE 133 (H) 03/07/2020    ALT 20 03/04/2020    AST 20 03/04/2020     No results for input(s): MG in the last 72 hours. Lab Results   Component Value Date    CALCIUM 8.8 03/07/2020        MRI Brain W WO Contrast   Final Result      1. No acute infarct. 2. A small chronic looking white matter lesion in the lateral mid   right occipital lobe. 3. Asymmetric enlargement of the right trigeminal nucleus. 4. No enhancing masses. 4. A tiny mucosal nodule in the right lateral sphenoid sinus. Assessment    Principal Problem:    Trigeminal neuralgia  Active Problems:    Fibromyalgia    Headache  Resolved Problems:    * No resolved hospital problems.  *      Plan:  Status post high-dose IV steroids  Trileptal, baclofen  PCA--DC today  Neurology consult appreciated  PT/OT  DVT PPx  DC planning     Electronically signed by Harsh Hargrove MD on 3/8/2020 at 3:12 PM

## 2020-03-09 PROCEDURE — 6360000002 HC RX W HCPCS: Performed by: NURSE PRACTITIONER

## 2020-03-09 PROCEDURE — 1200000000 HC SEMI PRIVATE

## 2020-03-09 PROCEDURE — 6370000000 HC RX 637 (ALT 250 FOR IP): Performed by: INTERNAL MEDICINE

## 2020-03-09 PROCEDURE — 2700000000 HC OXYGEN THERAPY PER DAY

## 2020-03-09 PROCEDURE — 6370000000 HC RX 637 (ALT 250 FOR IP): Performed by: NURSE PRACTITIONER

## 2020-03-09 PROCEDURE — 6360000002 HC RX W HCPCS: Performed by: INTERNAL MEDICINE

## 2020-03-09 PROCEDURE — 2580000003 HC RX 258: Performed by: NURSE PRACTITIONER

## 2020-03-09 RX ORDER — BACLOFEN 10 MG/1
10 TABLET ORAL 3 TIMES DAILY
Status: DISCONTINUED | OUTPATIENT
Start: 2020-03-09 | End: 2020-03-11 | Stop reason: HOSPADM

## 2020-03-09 RX ADMIN — BACLOFEN 10 MG: 10 TABLET ORAL at 20:05

## 2020-03-09 RX ADMIN — Medication 10 ML: at 09:10

## 2020-03-09 RX ADMIN — OXCARBAZEPINE 450 MG: 150 TABLET, FILM COATED ORAL at 09:10

## 2020-03-09 RX ADMIN — Medication 10 ML: at 20:05

## 2020-03-09 RX ADMIN — BACLOFEN 10 MG: 10 TABLET ORAL at 13:50

## 2020-03-09 RX ADMIN — ONDANSETRON 4 MG: 2 INJECTION INTRAMUSCULAR; INTRAVENOUS at 20:05

## 2020-03-09 RX ADMIN — KETOROLAC TROMETHAMINE 15 MG: 30 INJECTION, SOLUTION INTRAMUSCULAR; INTRAVENOUS at 05:16

## 2020-03-09 RX ADMIN — ONDANSETRON 4 MG: 2 INJECTION INTRAMUSCULAR; INTRAVENOUS at 09:57

## 2020-03-09 RX ADMIN — DEXLANSOPRAZOLE 60 MG: 60 CAPSULE, DELAYED RELEASE ORAL at 09:10

## 2020-03-09 RX ADMIN — OXCARBAZEPINE 450 MG: 150 TABLET, FILM COATED ORAL at 20:05

## 2020-03-09 ASSESSMENT — PAIN SCALES - GENERAL: PAINLEVEL_OUTOF10: 0

## 2020-03-09 NOTE — CARE COORDINATION
Discharge Plan is to return home with no needs. Pt lives alone with 5 steps to enter the home. Pt was independent prior to admission. Friend/Family will assist Pt PRN. PCP: Dr. Chhaya Dorsey. Pharmacy: [facility]. Pt and Friend Milad Share declined Alyssa Yañez. Alma Peralta will provide transportation at Discharge . SW/CM to follow for any discharge needs.

## 2020-03-09 NOTE — PLAN OF CARE
Problem: Pain:  Goal: Pain level will decrease  Description: Pain level will decrease  3/9/2020 1038 by Nino Mendoza RN  Outcome: Met This Shift  3/8/2020 2358 by Carmela Gee RN  Outcome: Met This Shift  Goal: Control of acute pain  Description: Control of acute pain  3/8/2020 2358 by Carmela Gee RN  Outcome: Met This Shift  Goal: Control of chronic pain  Description: Control of chronic pain  3/9/2020 1038 by Nino Mendoza RN  Outcome: Met This Shift  3/8/2020 2358 by Carmela Gee RN  Outcome: Met This Shift     Problem: Falls - Risk of:  Goal: Will remain free from falls  Description: Will remain free from falls  Outcome: Met This Shift  Goal: Absence of physical injury  Description: Absence of physical injury  Outcome: Met This Shift

## 2020-03-10 PROCEDURE — 97165 OT EVAL LOW COMPLEX 30 MIN: CPT

## 2020-03-10 PROCEDURE — 97161 PT EVAL LOW COMPLEX 20 MIN: CPT

## 2020-03-10 PROCEDURE — 6370000000 HC RX 637 (ALT 250 FOR IP): Performed by: INTERNAL MEDICINE

## 2020-03-10 PROCEDURE — 1200000000 HC SEMI PRIVATE

## 2020-03-10 PROCEDURE — 6370000000 HC RX 637 (ALT 250 FOR IP): Performed by: NURSE PRACTITIONER

## 2020-03-10 PROCEDURE — 2580000003 HC RX 258: Performed by: NURSE PRACTITIONER

## 2020-03-10 PROCEDURE — 6360000002 HC RX W HCPCS: Performed by: NURSE PRACTITIONER

## 2020-03-10 PROCEDURE — 97530 THERAPEUTIC ACTIVITIES: CPT

## 2020-03-10 RX ORDER — PROMETHAZINE HYDROCHLORIDE 25 MG/1
12.5 TABLET ORAL EVERY 6 HOURS PRN
Status: DISCONTINUED | OUTPATIENT
Start: 2020-03-10 | End: 2020-03-11 | Stop reason: HOSPADM

## 2020-03-10 RX ADMIN — BACLOFEN 10 MG: 10 TABLET ORAL at 08:15

## 2020-03-10 RX ADMIN — OXCARBAZEPINE 450 MG: 150 TABLET, FILM COATED ORAL at 21:00

## 2020-03-10 RX ADMIN — OXCARBAZEPINE 450 MG: 150 TABLET, FILM COATED ORAL at 08:15

## 2020-03-10 RX ADMIN — BACLOFEN 10 MG: 10 TABLET ORAL at 21:00

## 2020-03-10 RX ADMIN — DEXLANSOPRAZOLE 60 MG: 60 CAPSULE, DELAYED RELEASE ORAL at 08:15

## 2020-03-10 RX ADMIN — ONDANSETRON 4 MG: 2 INJECTION INTRAMUSCULAR; INTRAVENOUS at 02:05

## 2020-03-10 RX ADMIN — PROMETHAZINE HYDROCHLORIDE 12.5 MG: 25 TABLET ORAL at 08:21

## 2020-03-10 RX ADMIN — SODIUM CHLORIDE, PRESERVATIVE FREE 10 ML: 5 INJECTION INTRAVENOUS at 02:05

## 2020-03-10 RX ADMIN — PROMETHAZINE HYDROCHLORIDE 12.5 MG: 25 TABLET ORAL at 21:53

## 2020-03-10 RX ADMIN — Medication 10 ML: at 08:16

## 2020-03-10 RX ADMIN — Medication 10 ML: at 21:54

## 2020-03-10 RX ADMIN — BACLOFEN 10 MG: 10 TABLET ORAL at 14:12

## 2020-03-10 RX ADMIN — PROMETHAZINE HYDROCHLORIDE 12.5 MG: 25 TABLET ORAL at 15:49

## 2020-03-10 ASSESSMENT — PAIN DESCRIPTION - DESCRIPTORS: DESCRIPTORS: ACHING

## 2020-03-10 ASSESSMENT — PAIN DESCRIPTION - LOCATION: LOCATION: EYE;HEAD

## 2020-03-10 ASSESSMENT — PAIN DESCRIPTION - FREQUENCY: FREQUENCY: CONTINUOUS

## 2020-03-10 ASSESSMENT — PAIN SCALES - GENERAL: PAINLEVEL_OUTOF10: 1

## 2020-03-10 ASSESSMENT — PAIN DESCRIPTION - PAIN TYPE: TYPE: ACUTE PAIN

## 2020-03-10 ASSESSMENT — PAIN - FUNCTIONAL ASSESSMENT: PAIN_FUNCTIONAL_ASSESSMENT: PREVENTS OR INTERFERES SOME ACTIVE ACTIVITIES AND ADLS

## 2020-03-10 ASSESSMENT — PAIN DESCRIPTION - ONSET: ONSET: ON-GOING

## 2020-03-10 ASSESSMENT — PAIN DESCRIPTION - ORIENTATION: ORIENTATION: RIGHT

## 2020-03-10 NOTE — PROGRESS NOTES
collaboration. Patient was agreeable to evaluation. Results of the functional assessment are noted above. Upon entering the room patient was found supine in bed. Friend was present. Sat EOB x 5 minutes to increase dynamic sitting balance and activity tolerance. Gait completed to bathroom only. Lights in hallway to bright for her to tolerate. At end of session, patient in chair with  call light and phone within reach,  all lines and tubes intact, nursing notified. Patient feels she will have enough support to go home to her home upon D/C. This patient can benefit from the continuation of skilled PT with gait team to maximize functional level and return to PLOF. Treatment: Patient practiced and was instructed in the following treatment:  Functional activities completed with cues for sequencing. Noted to be slightly unsteady while ambulating which she attributed to \"vestibular problems\". Only required slight HHA to maintain balance. Pt's/ family goals   1. To rerturn home with support of her family. Patient and or family understand(s) diagnosis, prognosis, and plan of care. PLAN:    PT care will be provided in accordance with the objectives noted above. Exercises and functional mobility practice will be used as well as appropriate assistive devices or modalities to obtain goals. Patient and family education will also be administered as needed. Frequency of treatments: 2-5x/week x 1-2 weeks. Time in  0915  Time out  0940    Total Treatment Time  15 minutes     Evaluation Time includes thorough review of current medical information, gathering information on past medical history/social history and prior level of function, completion of standardized testing/informal observation of tasks, assessment of data and education on plan of care and goals.     CPT codes:  [x] Low Complexity PT evaluation 59404  [] Moderate Complexity PT evaluation 12044  [] High Complexity PT evaluation 92469  [] PT Re-evaluation [id number]  [] Gait training 71787 - minutes  [] Manual therapy 54840 - minutes  [x] Therapeutic activities 58859 15 minutes  [] Therapeutic exercises 88795 - minutes  [] Neuromuscular reeducation 68530 - minutes     Chanelle Alonzo, [address] [facility]

## 2020-03-10 NOTE — PROGRESS NOTES
Subjective:    Headache improving. She is complaining of nausea without vomiting. Objective:    /63   Pulse 71   Temp 98.6 °F (37 °C) (Temporal)   Resp 16   Ht 5' 2\" (1.575 m)   Wt 166 lb 0.1 oz (75.3 kg)   SpO2 92%   BMI 30.36 kg/m²     Current medications that patient is taking have been reviewed. General appearance: NAD, conversant. Uncomfortable appearing. HEENT: AT/NC, MMM  Neck: FROM, supple  Lungs: Clear to auscultation  CV: RRR, no MRGs  Abdomen: Soft, non-tender; no masses or HSM, +BS  Extremities: No peripheral edema or digital cyanosis  Skin: no rash, lesions or ulcers  Psych: Anxious, somewhat pressured/uninterruptible speech  Neuro: Alert and interactive, nonfocal.  Opening her eyes now, and can touch the sides of her face in V1 bilateral distribution without significant pain    Assessment/Plan:    Principal Problem:    Trigeminal neuralgia  Active Problems:    Fibromyalgia    Headache  Resolved Problems:    * No resolved hospital problems. *    Greatly appreciate Neuro assistance  Continue baclofen and carbamazepine  She is off all IV narcotics  Awaiting PT/OT evals  She still doesn't feel she can go home today due to nausea, but also refuses SNF    Requires continued inpatient level of care for nausea management.   Luis Fernando Fletcher    8:22 AM  3/10/2020  Cell: [phone]

## 2020-03-10 NOTE — PROGRESS NOTES
[]  Cognition []  Functional transfers  [x] IADLs [x] Safety Awareness [x]  Endurance [x]  Fine Motor Coordination [] Balance [x] Vision/perception [x] Sensation [x]   Gross Motor Coordination [] ROM [] Delirium []                  Motor Control []    Plan of Care: 5-7 days     ADL retraining [x]   Equipment needs [x]   Neuromuscular re-education [] Energy Conservation Techniques [x]  Functional Transfer training [x] Patient and/or Family Education [x]  Functional Mobility training [x]  Environmental Modifications [x]  Cognitive re-training []   Compensatory techniques for ADLs [x]  Splinting Needs []   Positioning to improve overall function [x]   Therapeutic Activity [x]  Therapeutic Exercise  [x]  Visual/Perceptual: []    Delirium prevention/treatment  []   Other:  []    Rehab Potential: Good for established goals    Patient / Family Goal: increase balance, decrease pain and home with assist.    Evaluation time includes thorough review of current medical information, gathering information on past medical & social history & PLOF, completion of standardized testing, informal observation of tasks, consultation with other medical professions/disciplines, assessment of data & development of POC/goals. Patient and/or family were instructed diagnosis, prognosis/goals and plan of care. yes Demonstrated good understanding. Treatment Time In: 9:205           Treatment Time Out: 9:40             Treatment Charges: Mins Units   Ther Ex  61529     Manual Therapy Nika Montesinos [id number]    ADL/Home Mgt [id number]     Neuro Re-ed 43607     Group Therapy      Orthotic manage/training  10893     Non-Billable Time     Total Timed Treatment 15 1     [] Malnutrition indicators have been identified and nursing has been notified to ensure a dietitian consult is ordered. low OT Evaluation + 15  treatment minutes  Matt Montes.  [address], [address]

## 2020-03-10 NOTE — PLAN OF CARE
Problem: Pain:  Goal: Pain level will decrease  Description: Pain level will decrease  3/10/2020 1018 by Makayla Wilkerson RN  Outcome: Met This Shift  3/9/2020 2253 by Tina Vargas RN  Outcome: Met This Shift  Goal: Control of acute pain  Description: Control of acute pain  3/10/2020 1018 by Makayla Wilkerson RN  Outcome: Met This Shift  3/9/2020 2253 by Tina Vargas RN  Outcome: Met This Shift  Goal: Control of chronic pain  Description: Control of chronic pain  Outcome: Met This Shift     Problem: Falls - Risk of:  Goal: Will remain free from falls  Description: Will remain free from falls  3/10/2020 1018 by Makayla Wilkerson RN  Outcome: Met This Shift  3/9/2020 2253 by Tina Vargas RN  Outcome: Met This Shift  Goal: Absence of physical injury  Description: Absence of physical injury  3/10/2020 1018 by Makayla Wilkerson RN  Outcome: Met This Shift  3/9/2020 2253 by Tina Vargas RN  Outcome: Met This Shift

## 2020-03-11 VITALS
DIASTOLIC BLOOD PRESSURE: 60 MMHG | HEIGHT: 62 IN | HEART RATE: 86 BPM | BODY MASS INDEX: 30.55 KG/M2 | OXYGEN SATURATION: 92 % | WEIGHT: 166.01 LBS | SYSTOLIC BLOOD PRESSURE: 131 MMHG | RESPIRATION RATE: 16 BRPM | TEMPERATURE: 97.8 F

## 2020-03-11 PROBLEM — G50.0 TRIGEMINAL NEURALGIA: Chronic | Status: ACTIVE | Noted: 2020-03-05

## 2020-03-11 PROBLEM — M79.7 FIBROMYALGIA: Chronic | Status: ACTIVE | Noted: 2017-07-25

## 2020-03-11 PROCEDURE — 6360000002 HC RX W HCPCS: Performed by: NURSE PRACTITIONER

## 2020-03-11 PROCEDURE — 6370000000 HC RX 637 (ALT 250 FOR IP): Performed by: NURSE PRACTITIONER

## 2020-03-11 PROCEDURE — 2580000003 HC RX 258: Performed by: NURSE PRACTITIONER

## 2020-03-11 PROCEDURE — 6370000000 HC RX 637 (ALT 250 FOR IP): Performed by: INTERNAL MEDICINE

## 2020-03-11 RX ORDER — BACLOFEN 10 MG/1
10 TABLET ORAL 3 TIMES DAILY
Qty: 90 TABLET | Refills: 0 | Status: SHIPPED | OUTPATIENT
Start: 2020-03-11 | End: 2020-04-10

## 2020-03-11 RX ORDER — ONDANSETRON 4 MG/1
4 TABLET, ORALLY DISINTEGRATING ORAL 3 TIMES DAILY PRN
Qty: 21 TABLET | Refills: 0 | Status: SHIPPED | OUTPATIENT
Start: 2020-03-11

## 2020-03-11 RX ORDER — OXCARBAZEPINE 300 MG/1
450 TABLET, FILM COATED ORAL 2 TIMES DAILY
Qty: 90 TABLET | Refills: 0 | Status: SHIPPED | OUTPATIENT
Start: 2020-03-11 | End: 2020-06-09 | Stop reason: SDUPTHER

## 2020-03-11 RX ADMIN — BACLOFEN 10 MG: 10 TABLET ORAL at 08:26

## 2020-03-11 RX ADMIN — ONDANSETRON 4 MG: 2 INJECTION INTRAMUSCULAR; INTRAVENOUS at 08:20

## 2020-03-11 RX ADMIN — DEXLANSOPRAZOLE 60 MG: 60 CAPSULE, DELAYED RELEASE ORAL at 08:28

## 2020-03-11 RX ADMIN — OXCARBAZEPINE 450 MG: 150 TABLET, FILM COATED ORAL at 08:27

## 2020-03-11 RX ADMIN — ONDANSETRON 4 MG: 2 INJECTION INTRAMUSCULAR; INTRAVENOUS at 02:06

## 2020-03-11 RX ADMIN — Medication 10 ML: at 08:20

## 2020-03-11 NOTE — DISCHARGE SUMMARY
Physician Discharge Summary     Patient ID:  Tyesha Route  [id number]  72 y.o.  1955    Admit date: 3/4/2020    Discharge date and time:  03/11/20     Admission Diagnoses:   Headache    Discharge Diagnoses:   Principal Problem:    Trigeminal neuralgia  Active Problems:    Fibromyalgia  Resolved Problems:    * No resolved hospital problems. *       Consults: neurology    Procedures: none    Hospital Course:   Patient presented with severe bilateral V1 distribution trigeminal pain, right greater than left. MRI confirms inflammation of right trigeminal nucleus. She was diagnosed with trigeminal neuralgia. She was initially started on carbamazepine 150 mg BID, which did not control her pain. Neurology was consulted. She was started on baclofen which caused some somnolence so the dose was backed down. The baclofen did have some beneficial effect on her pain. Her carbamazepine dose was titrated upwards. She was in so much pain that for a brief period of time she was on a morphine PCA but did not use a lot of morphine. Additionally, neurology gave her IV Solu-Medrol. After several days in the hospital the patient's pain gradually improved. She had less sensitivity to light and the allodynia in trigeminal distribution resolved. She did have some nausea which may be an effect of the trigeminal neuralgia itself, or could be a side effect of the carbamazepine or baclofen. I have recommended that she stay on the current doses for now, but if her pain is tolerable she could try to reduce the doses slightly and see if that helps. I have placed an urgent outpatient referral to neurology and I would like her to be seen in 1 to 2 weeks. Additionally I would recommend she follow-up with her primary care doctor in 1 to 2 weeks. MRI Brain     1. No acute infarct.       2. A small chronic looking white matter lesion in the lateral mid   right occipital lobe.     3. Asymmetric enlargement of the right trigeminal nucleus. 4. No enhancing masses. 4. A tiny mucosal nodule in the right lateral sphenoid sinus. Discharge Exam:  Vitals:    03/10/20 0730 03/10/20 1545 03/11/20 0012 03/11/20 0815   BP: 134/63 129/60 139/65 131/60   Pulse: 71 79 92 86   Resp: 16 16 16 16   Temp: 98.6 °F (37 °C) 98.6 °F (37 °C) 97.8 °F (36.6 °C) 97.8 °F (36.6 °C)   TempSrc: Temporal Temporal Temporal    SpO2: 92% 92%     Weight:       Height:            Gen: Pleasant female in NAD  CV: RRR no m/r/g  Pulm: CTAB  Neuro: Face symmetric, PERRLA, no further allodynia of bilateral V1 distribution. Condition:  Stable    Disposition: home    Patient Instructions:   Current Discharge Medication List      START taking these medications    Details   ondansetron (ZOFRAN-ODT) 4 MG disintegrating tablet Take 1 tablet by mouth 3 times daily as needed for Nausea or Vomiting  Qty: 21 tablet, Refills: 0      baclofen (LIORESAL) 10 MG tablet Take 1 tablet by mouth 3 times daily  Qty: 90 tablet, Refills: 0         CONTINUE these medications which have CHANGED    Details   OXcarbazepine (TRILEPTAL) 300 MG tablet Take 1.5 tablets by mouth 2 times daily  Qty: 90 tablet, Refills: 0         CONTINUE these medications which have NOT CHANGED    Details   dexlansoprazole (DEXILANT) 60 MG CPDR delayed release capsule Take 60 mg by mouth daily      ergocalciferol (ERGOCALCIFEROL) 1.25 MG (14273 UT) capsule Take 50,000 Units by mouth once a week         STOP taking these medications       amoxicillin (AMOXIL) 500 MG capsule Comments:   Reason for Stopping:         HYDROcodone-acetaminophen (NORCO) 5-325 MG per tablet Comments:   Reason for Stopping:         predniSONE (DELTASONE) 20 MG tablet Comments:   Reason for Stopping:         cyanocobalamin 1000 MCG tablet Comments:   Reason for Stopping:             Activity: activity as tolerated  Diet: regular diet    Follow-up with PCP in 1 week.     Note that over 30 minutes was spent in preparing discharge papers, discussing discharge with patient, medication review, etc.    Signed:  Magalys Sibley    3/11/2020  9:33 AM

## 2020-06-09 ENCOUNTER — OFFICE VISIT (OUTPATIENT)
Dept: NEUROLOGY | Age: 65
End: 2020-06-09
Payer: MEDICARE

## 2020-06-09 VITALS
SYSTOLIC BLOOD PRESSURE: 119 MMHG | HEART RATE: 76 BPM | WEIGHT: 145 LBS | HEIGHT: 62 IN | RESPIRATION RATE: 18 BRPM | BODY MASS INDEX: 26.68 KG/M2 | TEMPERATURE: 97.9 F | DIASTOLIC BLOOD PRESSURE: 64 MMHG | OXYGEN SATURATION: 96 %

## 2020-06-09 PROCEDURE — 99205 OFFICE O/P NEW HI 60 MIN: CPT | Performed by: PSYCHIATRY & NEUROLOGY

## 2020-06-09 PROCEDURE — 1090F PRES/ABSN URINE INCON ASSESS: CPT | Performed by: PSYCHIATRY & NEUROLOGY

## 2020-06-09 PROCEDURE — G8427 DOCREV CUR MEDS BY ELIG CLIN: HCPCS | Performed by: PSYCHIATRY & NEUROLOGY

## 2020-06-09 PROCEDURE — G8417 CALC BMI ABV UP PARAM F/U: HCPCS | Performed by: PSYCHIATRY & NEUROLOGY

## 2020-06-09 RX ORDER — BACLOFEN 10 MG/1
10 TABLET ORAL 3 TIMES DAILY
Qty: 90 TABLET | Refills: 5 | Status: SHIPPED
Start: 2020-06-09 | End: 2020-11-10 | Stop reason: SDUPTHER

## 2020-06-09 RX ORDER — OXCARBAZEPINE 300 MG/1
450 TABLET, FILM COATED ORAL 2 TIMES DAILY
Qty: 90 TABLET | Refills: 5 | Status: SHIPPED
Start: 2020-06-09 | End: 2020-11-10 | Stop reason: SDUPTHER

## 2020-06-09 RX ORDER — BACLOFEN 10 MG/1
10 TABLET ORAL 3 TIMES DAILY
COMMUNITY
End: 2020-06-09 | Stop reason: SDUPTHER

## 2020-06-09 RX ORDER — POLYETHYLENE GLYCOL 3350 17 G/17G
17 POWDER, FOR SOLUTION ORAL DAILY
COMMUNITY

## 2020-06-09 NOTE — PROGRESS NOTES
This 55-year-old right-handed woman was referred for additional evaluation and management of right atypical facial pains. She was deemed a good historian. Her medications included Trileptal, baclofen, Zofran, Dexilant, vitamin D and polyethylene glycol. Her past medical history was remarkable for hiatal hernia with esophageal reflux, as well as fibromyalgia, right temporomandibular joint disease and chronic fatigue. She denied hypertension, diabetes mellitus, strokes, seizures, heart disease, lung disorders, cancers, other connective tissue disorders, kidney disease or skin abnormalities. She denied depression, although she readily admitted to marked anxiety. She underwent a LEEP procedure and wisdom teeth extraction; a total abdominal hysterectomy was planned soon. She reported allergic reactions to sulfa but no other medications. She denied any trauma. She was a native the [facility]. She was a recent , with one healthy son and 2 healthy grandchildren. There are no neurological disorders in her family. She recently retired; she was living alone. She was rarely leaving the home due to the pandemic lockdown. She did not smoke or drink. She never abused illicit drugs. She was sleeping moderately well. She was eating without issues. Review of systems was otherwise unremarkable, except as noted below. She presented in March of this year with marked, lightening pains occurring in her right vertex and then radiating into her right forehead, cheek and jaw. Initially, these pains that lasted only seconds but occurred multiple times throughout the day. These pains subsequently progressed, at times lasting up to 6 minutes. Touching the right side of her face, exposure to the wind and chewing on that side aggravated these discomforts. She then claimed she was unable to open both her eyes due to the pains. She then denied any left-sided involvement.     She was initially

## 2020-11-10 ENCOUNTER — VIRTUAL VISIT (OUTPATIENT)
Dept: NEUROLOGY | Age: 65
End: 2020-11-10
Payer: MEDICARE

## 2020-11-10 PROCEDURE — 99443 PR PHYS/QHP TELEPHONE EVALUATION 21-30 MIN: CPT | Performed by: PSYCHIATRY & NEUROLOGY

## 2020-11-10 RX ORDER — BACLOFEN 10 MG/1
10 TABLET ORAL 3 TIMES DAILY
Qty: 270 TABLET | Refills: 3 | Status: SHIPPED
Start: 2020-11-10 | End: 2021-02-18

## 2020-11-10 RX ORDER — OXCARBAZEPINE 300 MG/1
450 TABLET, FILM COATED ORAL 2 TIMES DAILY
Qty: 270 TABLET | Refills: 3 | Status: SHIPPED
Start: 2020-11-10 | End: 2021-02-18

## 2020-11-10 NOTE — PROGRESS NOTES
This 60-year-old right-handed woman was referred for evaluation and management of right atypical facial pains. She was deemed a good historian. This visit was per telephone. Her medications were again Trileptal, baclofen, Zofran, Dexilant, vitamin D and polyethylene glycol. Her past medical history was remarkable for hiatal hernia with esophageal reflux, as well as fibromyalgia, right temporomandibular joint disease and chronic fatigue. She underwent a LEEP procedure and wisdom teeth extraction; a total abdominal hysterectomy was cancelled with a recent normal PAP smear. She was sleeping moderately well. She was eating without problems. She presented in March of this year with marked, lightening pains occurring in her right vertex, then radiating into her right forehead, cheek and jaw. Initially, these pains lasted only seconds but occurred multiple times throughout the day. These pains subsequently progressed, at times lasting up to 6 minutes. Touching the right side of her face, exposure to the wind and chewing on that side aggravated these discomforts. She then claimed she was unable to open both her eyes due to the pains. She then denied any left-sided involvement. She was initially treated with steroids and narcotics. She underwent extensive work-up including an unremarkable MRI of her head, as well as CT scanning. She was then prescribed Trileptal and baclofen. With this combination, her pains have almost completely resolved. She continued on this regimen, rarely experiencing brief lightninglike sensations. She again denied visual loss, double vision, swallowing or chewing abnormalities, cognitive problems, weakness in any limb, sensory abnormalities in any limb, clumsiness or loss of consciousness. She reported rare lightening-like pain in her foot as well. She denied back pains or radiating discomforts. She reported no medical conditions.     Review of systems was otherwise unremarkable. She was afebrile and resting comfortably. She was breathing without issues, denying any chest pain. She was an elderly woman in no acute distress, who was alert, cooperative and oriented. She sounded less anxious on the phone. Her skin was unremarkable. Her limbs displayed no abnormalities. Neurological examination produced an intact mental status except for moderate anxiety. There was no aphasia or dysarthria. Cranial nerve testing was unrevealing. There was no facial weakness or sensory loss on the right side. She found normal bulk, with 5/5 strength throughout. There were no abnormal movements. Light touch was intact in all limbs. There were no ataxias. She walked well. .    There were no additional laboratory data. This individual presents with atypical facial pains, involving the entire right side of her brain. These first presented as classical trigeminal neuralgia, despite involving the V1 distribution. She continued responding remarkably well to Trileptal and baclofen. We will continue with that regimen. Her lightninglike sensations in the right foot are likely from intermittent nerve compression. These worsen, she will report back. She again reports no neurological deficits. Her MRI is interesting for an enlarged right trigeminal nucleus---possibly from a viral infection. Fortunately, her anxiety is improving. I attempted to reassure her at present. She is stable medically, despite her comorbidities. She will return in 6 months. Trileptal was maintained at 450 mg twice daily, as well as baclofen 10 mg 3 times daily. These medications were renewed. I recommended she exercise more and leave the home, with proper viral protection. She will call any time if problems arise. I spent 30 minutes with the patient with over 50 % spent in counseling and disease management.   All patient issues were addressed and all questions were answered. Marty Perez is a 72 y.o. woman was evaluated via telephone on 11/10/2020. Consent:  She and/or health care decision maker is aware that that she may receive a bill for this telephone service, depending on her insurance coverage, and has provided verbal consent to proceed---yes. Documentation:  I communicated with the patient and/or health care decision maker about trigeminal neuralgia. Details of this discussion including any medical advice provided per telephone. I affirmed this is a Patient Initiated Episode with an Established Patient who has not had a related appointment within my department in the past 7 days or scheduled within the next 24 hours. Again I spent 30 minutes with the patient.     Bety Rashid

## 2021-02-18 RX ORDER — BACLOFEN 10 MG/1
TABLET ORAL
Qty: 270 TABLET | Refills: 3 | Status: SHIPPED
Start: 2021-02-18 | End: 2021-11-09 | Stop reason: SDUPTHER

## 2021-02-18 RX ORDER — OXCARBAZEPINE 300 MG/1
450 TABLET, FILM COATED ORAL 2 TIMES DAILY
Qty: 270 TABLET | Refills: 3 | Status: SHIPPED
Start: 2021-02-18 | End: 2021-11-09 | Stop reason: SDUPTHER

## 2021-05-04 ENCOUNTER — OFFICE VISIT (OUTPATIENT)
Dept: NEUROLOGY | Age: 66
End: 2021-05-04
Payer: MEDICARE

## 2021-05-04 VITALS
SYSTOLIC BLOOD PRESSURE: 108 MMHG | WEIGHT: 157 LBS | RESPIRATION RATE: 18 BRPM | HEIGHT: 62 IN | BODY MASS INDEX: 28.89 KG/M2 | HEART RATE: 77 BPM | DIASTOLIC BLOOD PRESSURE: 70 MMHG | TEMPERATURE: 97.7 F | OXYGEN SATURATION: 95 %

## 2021-05-04 DIAGNOSIS — F41.9 ANXIETY: ICD-10-CM

## 2021-05-04 DIAGNOSIS — H81.10 BENIGN PAROXYSMAL POSITIONAL VERTIGO, UNSPECIFIED LATERALITY: ICD-10-CM

## 2021-05-04 DIAGNOSIS — G50.1 ATYPICAL FACIAL PAIN: ICD-10-CM

## 2021-05-04 DIAGNOSIS — G50.0 TRIGEMINAL NEURALGIA: Primary | ICD-10-CM

## 2021-05-04 PROCEDURE — G8417 CALC BMI ABV UP PARAM F/U: HCPCS | Performed by: PSYCHIATRY & NEUROLOGY

## 2021-05-04 PROCEDURE — 4040F PNEUMOC VAC/ADMIN/RCVD: CPT | Performed by: PSYCHIATRY & NEUROLOGY

## 2021-05-04 PROCEDURE — 3017F COLORECTAL CA SCREEN DOC REV: CPT | Performed by: PSYCHIATRY & NEUROLOGY

## 2021-05-04 PROCEDURE — G8427 DOCREV CUR MEDS BY ELIG CLIN: HCPCS | Performed by: PSYCHIATRY & NEUROLOGY

## 2021-05-04 PROCEDURE — 1090F PRES/ABSN URINE INCON ASSESS: CPT | Performed by: PSYCHIATRY & NEUROLOGY

## 2021-05-04 PROCEDURE — 1036F TOBACCO NON-USER: CPT | Performed by: PSYCHIATRY & NEUROLOGY

## 2021-05-04 PROCEDURE — G8400 PT W/DXA NO RESULTS DOC: HCPCS | Performed by: PSYCHIATRY & NEUROLOGY

## 2021-05-04 PROCEDURE — 99215 OFFICE O/P EST HI 40 MIN: CPT | Performed by: PSYCHIATRY & NEUROLOGY

## 2021-05-04 PROCEDURE — 1123F ACP DISCUSS/DSCN MKR DOCD: CPT | Performed by: PSYCHIATRY & NEUROLOGY

## 2021-05-04 RX ORDER — MECLIZINE HCL 12.5 MG/1
12.5 TABLET ORAL 2 TIMES DAILY
Qty: 60 TABLET | Refills: 5 | Status: SHIPPED | OUTPATIENT
Start: 2021-05-04 | End: 2021-10-31

## 2021-05-04 NOTE — PROGRESS NOTES
This 49-year-old right-handed woman was referred for evaluation and management of right atypical facial pains. She remained a good historian. Her medications continued as Trileptal, baclofen, Zofran, Dexilant, vitamin D and polyethylene glycol. Her past medical history was remarkable for hiatal hernia with esophageal reflux, as well as fibromyalgia, right temporomandibular joint disease and chronic fatigue. She denied hypertension, diabetes mellitus, strokes, seizures, heart disease, lung disorders, cancers, other connective tissue disorders, kidney disease or skin abnormalities. She denied depression, although she readily admitted to marked anxiety. She underwent a LEEP procedure and wisdom teeth extraction. She presented last year with marked, lightening pains occurring in her right vertex, radiating into her right forehead, cheek and jaw. Initially, these pains lasted only seconds but occurred multiple times daily. These pains progressed, at times lasting 6 minutes. Touching the right side of her face, exposure to the wind and chewing on that side aggravated these discomforts. She was unable to open both her eyes due to the pains. She again denied any left-sided involvement. She was initially treated with steroids and narcotics. She underwent extensive work-up including an unremarkable MRI of her head and CT scanning. She was prescribed Trileptal and baclofen. This regimen resolved her pains almost completely. She continued on this regimen, again rarely experiencing brief shooting sensations. She reported the return of unsteadiness. She has suffered from marked vertigo from benign positional vertigo years ago. She had used meclizine in the past but not recently. There was no vertigo but unsteadiness when turning quickly. She was informed in the past to avoid alcohol, chocolates and caffeine, which she still did. She denied any other associated issues.     There was no visual loss, double vision, swallowing or chewing abnormalities, cognitive problems, weakness in any limb, sensory abnormalities in any limb, clumsiness or loss of consciousness. She denied any medical conditions. She was referred here for evaluation and management. She was sleeping and eating better. She was practicing proper pandemic precautions. She had received her COVID-19 vaccinations. Review of systems was otherwise unremarkable. Physical examination displayed stable vital signs. She was an elderly woman in no acute distress, who was alert, cooperative and oriented. She continued to be anxious and paranoid concerning this diagnosis. Her skin was unremarkable. Head examination was unrevealing, without temporomandibular joint tenderness. Her lungs were clear to auscultation. Cardiac testing proved unremarkable. Her abdomen was unrevealing. I found +1 peripheral pulses without bruits. Her limbs displayed no abnormalities. Neurological examination produced an intact mental status except for her anxiety. Cranial nerve testing was also unrevealing. There was no facial weakness or sensory loss on the right. Extraocular movements remained intact. Corneals were also present. I found normal tone and bulk, with 5/5 strength throughout. Reflexes were +3 throughout. All sensory modalities continued intact. Finger-to-nose and heel shin testing were performed well. She walked well. Laboratory data included an unremarkable MRI, except for an enlarged right trigeminal nucleus. CMP and CBC were unrevealing. CRP was 0.3. Sedimentation rate was 15. CT of the brain was unremarkable. This individual presents with atypical facial pains, involving the entire right side of her face. These first presented as classical trigeminal neuralgia, despite involving the V1 distribution. She continued responding well to her current regimen of Trileptal and baclofen.   We will continue that regimen. She displays no neurological deficits. Her MRI is interesting for an enlarged right trigeminal nucleuspossibly from a viral infection. However, serial scans will be considered to rule out any progression of this finding. Fortunately, she is responding remarkably well to her current regimen. Atypical facial pains may also be related to arterial compression of the right trigeminal nerve. If necessary, MRAs of the base of skull will be performed. Unfortunately, she still suffers from anxiety from this disorder. I reassured her again. She now suffers from benign positional vertigo. She will start on meclizine 12.5 mg twice daily. She is stable medically, despite her comorbidities. She will return in 4 months. Trileptal was maintained at 450 mg twice daily, as well as baclofen 10 mg 3 times daily. Meclizine was started at 12.5 mg twice daily. I recommended she exercise more and leave the home, with proper viral protection. She will report back in 3 to 4 weeks. She will call any time if problems arise. I spent 40 minutes with the patient with over 50 % spent in counseling and disease management. All patient issues were addressed and all questions were answered.

## 2021-11-09 ENCOUNTER — TELEPHONE (OUTPATIENT)
Dept: NEUROLOGY | Age: 66
End: 2021-11-09

## 2021-11-09 ENCOUNTER — OFFICE VISIT (OUTPATIENT)
Dept: NEUROLOGY | Age: 66
End: 2021-11-09
Payer: MEDICARE

## 2021-11-09 VITALS
WEIGHT: 162 LBS | DIASTOLIC BLOOD PRESSURE: 72 MMHG | OXYGEN SATURATION: 95 % | HEIGHT: 62 IN | BODY MASS INDEX: 29.81 KG/M2 | SYSTOLIC BLOOD PRESSURE: 123 MMHG | TEMPERATURE: 98.1 F | RESPIRATION RATE: 18 BRPM | HEART RATE: 62 BPM

## 2021-11-09 DIAGNOSIS — G50.0 RIGHT TRIGEMINAL NEURALGIA: Primary | ICD-10-CM

## 2021-11-09 PROCEDURE — G8400 PT W/DXA NO RESULTS DOC: HCPCS | Performed by: PSYCHIATRY & NEUROLOGY

## 2021-11-09 PROCEDURE — 4040F PNEUMOC VAC/ADMIN/RCVD: CPT | Performed by: PSYCHIATRY & NEUROLOGY

## 2021-11-09 PROCEDURE — 1036F TOBACCO NON-USER: CPT | Performed by: PSYCHIATRY & NEUROLOGY

## 2021-11-09 PROCEDURE — 99215 OFFICE O/P EST HI 40 MIN: CPT | Performed by: PSYCHIATRY & NEUROLOGY

## 2021-11-09 PROCEDURE — 3017F COLORECTAL CA SCREEN DOC REV: CPT | Performed by: PSYCHIATRY & NEUROLOGY

## 2021-11-09 PROCEDURE — G8484 FLU IMMUNIZE NO ADMIN: HCPCS | Performed by: PSYCHIATRY & NEUROLOGY

## 2021-11-09 PROCEDURE — 1123F ACP DISCUSS/DSCN MKR DOCD: CPT | Performed by: PSYCHIATRY & NEUROLOGY

## 2021-11-09 PROCEDURE — G8417 CALC BMI ABV UP PARAM F/U: HCPCS | Performed by: PSYCHIATRY & NEUROLOGY

## 2021-11-09 PROCEDURE — 1090F PRES/ABSN URINE INCON ASSESS: CPT | Performed by: PSYCHIATRY & NEUROLOGY

## 2021-11-09 PROCEDURE — G8427 DOCREV CUR MEDS BY ELIG CLIN: HCPCS | Performed by: PSYCHIATRY & NEUROLOGY

## 2021-11-09 RX ORDER — BACLOFEN 10 MG/1
TABLET ORAL
Qty: 270 TABLET | Refills: 3 | Status: SHIPPED
Start: 2021-11-09 | End: 2022-11-01 | Stop reason: SDUPTHER

## 2021-11-09 RX ORDER — OXCARBAZEPINE 300 MG/1
450 TABLET, FILM COATED ORAL 2 TIMES DAILY
Qty: 270 TABLET | Refills: 3 | Status: SHIPPED
Start: 2021-11-09 | End: 2022-11-01 | Stop reason: SDUPTHER

## 2021-11-09 NOTE — PROGRESS NOTES
This 59-year-old right-handed woman was referred for evaluation and management of right atypical facial pains. She remained a good historian. Her medications continued as oxcarbazepine, baclofen, ondansetron, dexlansoprazole, cholecalciferol and polyethylene glycol. Her medical history was remarkable for hiatal hernia with esophageal reflux, as well as fibromyalgia, right temporomandibular joint disease and chronic fatigue. She again denied hypertension, diabetes mellitus, strokes, seizures, heart disease, lung disorders, cancers, other connective tissue disorders, kidney disease or skin abnormalities. She still suffered from marked anxiety. She underwent a LEEP procedure and wisdom teeth extraction. She presented almost 2 years ago with marked, lightening pains occurring in her right vertex, radiating into her right forehead, cheek and jaw. Initially, these pains lasted only seconds but occurred multiple times daily. These pains progressed, at times lasting 6 minutes. Touching her right face, exposure to the wind and chewing on that side aggravated these discomforts. She was unable to open both her eyes due to these pains. There remained no left-sided involvement. She was initially treated with steroids and narcotics. She underwent extensive work-up with an unremarkable MRI of her head and CT scanning. She was then prescribed oxcarbazepine and baclofen. This regimen resolved her pains almost completely. She continued on this regimen, now noting only occasional, mild discomforts along her right cheek. Her vertigo occurred rarely. There was questionable tongue swelling using meclizine. She felt she did not require other medications. She avoided the spells by arising and turning slowly. She denied any other associated issues.     There was no visual loss, double vision, swallowing or chewing abnormalities, cognitive problems, weakness in any limb, sensory abnormalities in any limb, clumsiness or loss of consciousness. She denied any medical conditions. She was referred here for evaluation and management. She was sleeping and eating better. She still practiced proper pandemic precautions. She received her COVID-19 vaccinations and booster. Review of systems was otherwise unremarkable. Physical examination displayed stable vital signs. She was an elderly woman in no acute distress, who was alert, cooperative and oriented. She remained anxious concerning this diagnosis. Her skin was unremarkable. Head examination was unrevealing, without temporomandibular joint tenderness. Her lungs were clear to auscultation. Cardiac testing proved unremarkable. I found +1 peripheral pulses without bruits. Her limbs displayed no abnormalities. Neurological examination produced an intact mental status except for her anxiety. Cranial nerve testing was unrevealing. I found no facial weakness or sensory loss on the right. Extraocular movements continued intact. Corneals were also present. I found normal tone and bulk, with 5/5 strength throughout. Reflexes were +3 throughout without pathological reflexes. All sensory modalities continued intact. Finger-to-nose and heel shin testing were performed well. She walked well. Laboratory data included an unremarkable MRI, except for an enlarged right trigeminal nucleus. CMP and CBC were unrevealing. Sedimentation rate was 15. CT of the brain was unremarkable. This individual presents with atypical facial pains, involving the entire right side of her face. These first presented as classical trigeminal neuralgia, despite involving the V1 distribution. She continued responding remarkably well to her current regimen of oxcarbazepine and baclofen. We will continue that treatment. She again displays no neurological deficits. Her MRI is interesting for an enlarged right trigeminal nucleus, possibly from a viral infection.   A repeat MRI was scheduled. Atypical facial pains may also be related to arterial compression of the right trigeminal nerve. If necessary, MRAs of the base of skull will be performed. Unfortunately, she still suffers from anxiety from this disorder. I reassured her again. Her benign positional vertigo is rarely problematic. She is stable medically, despite her comorbidities. She will return in 6 months. Oxcarbazepine was maintained at 450 mg twice daily, as well as baclofen 10 mg 3 times daily. MRIs of the brain with and without contrast were scheduled. I recommended she exercise more and leave the home. She will report back after the MRI was completed. She will call any time if problems arise. I spent 40 minutes with the patient with over 50 % spent in counseling and disease management. All patient issues were addressed and all questions were answered.

## 2021-11-16 DIAGNOSIS — G50.0 RIGHT TRIGEMINAL NEURALGIA: ICD-10-CM

## 2021-11-17 ENCOUNTER — TELEPHONE (OUTPATIENT)
Dept: NEUROLOGY | Age: 66
End: 2021-11-17

## 2022-05-03 ENCOUNTER — OFFICE VISIT (OUTPATIENT)
Dept: NEUROLOGY | Age: 67
End: 2022-05-03
Payer: MEDICARE

## 2022-05-03 VITALS
OXYGEN SATURATION: 95 % | WEIGHT: 167 LBS | BODY MASS INDEX: 30.73 KG/M2 | HEART RATE: 67 BPM | DIASTOLIC BLOOD PRESSURE: 64 MMHG | RESPIRATION RATE: 18 BRPM | SYSTOLIC BLOOD PRESSURE: 115 MMHG | HEIGHT: 62 IN

## 2022-05-03 DIAGNOSIS — F41.9 ANXIETY: ICD-10-CM

## 2022-05-03 DIAGNOSIS — G50.1 ATYPICAL FACIAL PAIN: Primary | ICD-10-CM

## 2022-05-03 DIAGNOSIS — G50.0 TRIGEMINAL NEURALGIA: ICD-10-CM

## 2022-05-03 DIAGNOSIS — H81.10 BENIGN PAROXYSMAL POSITIONAL VERTIGO, UNSPECIFIED LATERALITY: ICD-10-CM

## 2022-05-03 PROCEDURE — 3017F COLORECTAL CA SCREEN DOC REV: CPT | Performed by: PSYCHIATRY & NEUROLOGY

## 2022-05-03 PROCEDURE — G8417 CALC BMI ABV UP PARAM F/U: HCPCS | Performed by: PSYCHIATRY & NEUROLOGY

## 2022-05-03 PROCEDURE — 1090F PRES/ABSN URINE INCON ASSESS: CPT | Performed by: PSYCHIATRY & NEUROLOGY

## 2022-05-03 PROCEDURE — G8427 DOCREV CUR MEDS BY ELIG CLIN: HCPCS | Performed by: PSYCHIATRY & NEUROLOGY

## 2022-05-03 PROCEDURE — 99215 OFFICE O/P EST HI 40 MIN: CPT | Performed by: PSYCHIATRY & NEUROLOGY

## 2022-05-03 PROCEDURE — 4040F PNEUMOC VAC/ADMIN/RCVD: CPT | Performed by: PSYCHIATRY & NEUROLOGY

## 2022-05-03 PROCEDURE — 1123F ACP DISCUSS/DSCN MKR DOCD: CPT | Performed by: PSYCHIATRY & NEUROLOGY

## 2022-05-03 PROCEDURE — 1036F TOBACCO NON-USER: CPT | Performed by: PSYCHIATRY & NEUROLOGY

## 2022-05-03 PROCEDURE — G8400 PT W/DXA NO RESULTS DOC: HCPCS | Performed by: PSYCHIATRY & NEUROLOGY

## 2022-05-03 NOTE — PROGRESS NOTES
This 79year-old right-handed woman was referred for evaluation and management of right atypical facial pains. She remained a good historian. Her medications were now oxcarbazepine, baclofen, ondansetron, dexlansoprazole, cholecalciferol and polyethylene glycol. Her medical history was remarkable for hiatal hernia with reflux, fibromyalgia, right temporomandibular joint disease and chronic fatigue. She again denied hypertension, diabetes mellitus, strokes, seizures, heart disease, lung disorders, cancers or skin abnormalities. She still suffered from anxiety. She presented over 2 years ago with marked, lightening pains occurring in her right vertex, radiating into the right forehead, cheek and jaw. Initially, these pains lasted only seconds but occurred multiple times daily. These pains progressed, at times lasting minutes. Touching her right face, exposure to the wind and chewing on that side aggravated these discomforts. She was unable to open both her eyes with these pains. There remained no left-sided involvement. She was initially treated with steroids and narcotics. She underwent extensive investigation with an unremarkable MRI of her head and CT scanning. Repeat MRIs were also unrevealing. She was prescribed oxcarbazepine and baclofen. This regimen again completely resolved these pains. There remained only minimal, rare discomforts in her right cheek. Her vertigo rarely occurred. There was questionable tongue swelling using meclizine. She did not wish to use other medications. She still reported difficulty sleeping, she will first try melatonin. I question obstructive sleep apnea; she was reluctant to undergo sleep studies at present. There was no visual loss, double vision, swallowing or chewing abnormalities, cognitive problems, weakness in any limb, sensory abnormalities in any limb, clumsiness or loss of consciousness. She denied any medical conditions.     She ate well.  She still practiced proper pandemic precautions. She received her COVID-19 vaccinations and booster. Review of systems was otherwise unremarkable. Physical examination displayed stable vital signs. Blood pressure was 115/64. She was an elderly woman in no acute distress, who was alert, cooperative and oriented. She was last anxious. Her skin was unremarkable. Head examination now reveals small posterior pharyngeal aperture, without temporomandibular joint tenderness. Her lungs were clear to auscultation. Cardiac testing proved unremarkable. I found +1 peripheral pulses without bruits. Her limbs displayed no abnormalities. Neurological examination produced an intact mental status except for the anxiety. Cranial nerve testing was unrevealing. I found no facial weakness or sensory loss on the right. Extraocular movements continued intact. Corneals were present. I found normal tone and bulk, with 5/5 strength throughout. Reflexes were +3 throughout without pathological reflexes. Light touch and vibration were intact. Finger-to-nose and heel shin testings were performed well. She walked well. Laboratory data included a recent unremarkable MRI, except for an enlarged right trigeminal nucleus. Past CMP and CBC were unrevealing. Sedimentation rate was 15. This individual presents with atypical facial pains, involving the entire right side of her face. These first presented as classical trigeminal neuralgia, despite involving the V1 distribution. She continued responding remarkably well to oxcarbazepine and baclofen. We will continue that regimen. She again displays no neurological deficits. Her MRI is interesting for an enlarged right trigeminal nucleus, possibly from a viral infection. Repeat MRI showed no changes. Atypical facial pains may also be related to arterial compression of the right trigeminal nerve.   If necessary, MRAs of the base of skull will be performed. She still suffers from anxiety from this disorder. She will use melatonin 10 mg at night to aid sleep; if necessary, I will consider trazodone. Her benign positional vertigo remains rarely problematic. She is stable medically, despite her comorbidities. She will return in 6 months, to see me. Oxcarbazepine was maintained at 450 mg twice daily and baclofen 10 mg thrice daily. I again recommended she exercise on a regular basis. She will report back in 1 month. If no better, sleep studies will be ordered. She will call any time if problems arise. I spent 40 minutes with the patient with over 50 % spent in counseling and disease management. All patient issues were addressed and all questions were answered.

## 2022-11-01 ENCOUNTER — OFFICE VISIT (OUTPATIENT)
Dept: NEUROLOGY | Age: 67
End: 2022-11-01
Payer: MEDICARE

## 2022-11-01 VITALS
DIASTOLIC BLOOD PRESSURE: 70 MMHG | OXYGEN SATURATION: 94 % | HEART RATE: 77 BPM | SYSTOLIC BLOOD PRESSURE: 133 MMHG | BODY MASS INDEX: 26.68 KG/M2 | HEIGHT: 62 IN | WEIGHT: 145 LBS | TEMPERATURE: 96.9 F

## 2022-11-01 DIAGNOSIS — G50.1 ATYPICAL FACIAL PAIN: Primary | ICD-10-CM

## 2022-11-01 DIAGNOSIS — F41.9 ANXIETY: ICD-10-CM

## 2022-11-01 PROCEDURE — G8400 PT W/DXA NO RESULTS DOC: HCPCS | Performed by: PSYCHIATRY & NEUROLOGY

## 2022-11-01 PROCEDURE — G8417 CALC BMI ABV UP PARAM F/U: HCPCS | Performed by: PSYCHIATRY & NEUROLOGY

## 2022-11-01 PROCEDURE — 3017F COLORECTAL CA SCREEN DOC REV: CPT | Performed by: PSYCHIATRY & NEUROLOGY

## 2022-11-01 PROCEDURE — G9899 SCRN MAM PERF RSLTS DOC: HCPCS | Performed by: PSYCHIATRY & NEUROLOGY

## 2022-11-01 PROCEDURE — G8427 DOCREV CUR MEDS BY ELIG CLIN: HCPCS | Performed by: PSYCHIATRY & NEUROLOGY

## 2022-11-01 PROCEDURE — 1123F ACP DISCUSS/DSCN MKR DOCD: CPT | Performed by: PSYCHIATRY & NEUROLOGY

## 2022-11-01 PROCEDURE — 1090F PRES/ABSN URINE INCON ASSESS: CPT | Performed by: PSYCHIATRY & NEUROLOGY

## 2022-11-01 PROCEDURE — 1036F TOBACCO NON-USER: CPT | Performed by: PSYCHIATRY & NEUROLOGY

## 2022-11-01 PROCEDURE — G8484 FLU IMMUNIZE NO ADMIN: HCPCS | Performed by: PSYCHIATRY & NEUROLOGY

## 2022-11-01 PROCEDURE — 99215 OFFICE O/P EST HI 40 MIN: CPT | Performed by: PSYCHIATRY & NEUROLOGY

## 2022-11-01 RX ORDER — OXCARBAZEPINE 300 MG/1
300 TABLET, FILM COATED ORAL 2 TIMES DAILY
Qty: 180 TABLET | Refills: 3 | Status: SHIPPED | OUTPATIENT
Start: 2022-11-01 | End: 2023-10-27

## 2022-11-01 RX ORDER — BACLOFEN 10 MG/1
TABLET ORAL
Qty: 270 TABLET | Refills: 3 | Status: SHIPPED | OUTPATIENT
Start: 2022-11-01

## 2022-11-01 RX ORDER — EFLORNITHINE HYDROCHLORIDE 139 MG/G
CREAM TOPICAL
COMMUNITY
Start: 2022-09-21

## 2022-11-01 NOTE — PROGRESS NOTES
This 79year-old right-handed woman was referred for evaluation and management of right atypical facial pains. She remained a good historian. She was again alone for this interview. Her medications continued as oxcarbazepine, baclofen, ondansetron, dexlansoprazole, cholecalciferol and polyethylene glycol. However, she is on reduced doses of oxcarbazepine, 300 mg twice daily. Her medical history was remarkable for hiatal hernia with reflux, fibromyalgia, right temporomandibular joint disease and chronic fatigue. She again denied hypertension, diabetes mellitus, strokes, seizures, heart disease, lung disorders or skin abnormalities. She still suffered from anxiety, but less so. She presented 3 years ago with marked, lightening pains occurring in her right vertex, radiating into the right forehead, cheek and jaw. Initially, these pains lasted only seconds but occurred multiple times daily. These pains progressed, at times lasting minutes. Touching her right face, exposure to the wind and chewing on that side aggravated these discomforts. She was unable to open both her eyes with these pains. There remained no left-sided involvement. She is initially diagnosed with trigeminal neuralgia. She was at first treated with steroids and narcotics. She underwent extensive investigation with an unremarkable MRI of her head and CT scanning. Repeat MRIs were also unrevealing. She was prescribed oxcarbazepine and baclofen. This regimen completely resolved these pains. There remained only minimal, rare discomforts in her right cheek. Months ago, she developed a transient rash of both calves. Her primary care physician question oxcarbazepine's involvement. The dosing was reduced to 300 mg twice daily; the rash resolved. Fortunately, with that reduced dosing, her facial pains have not returned. Her vertigo rarely occurred. There was questionable tongue swelling using meclizine.   She again did not wish to use other medications. She was now sleeping much better. I previously questioned obstructive sleep apnea; she had refused sleep studies. There was no visual loss, double vision, swallowing or chewing abnormalities, cognitive problems, weakness in any limb, sensory abnormalities in any limb, clumsiness or loss of consciousness. She again denied any medical conditions. Sh was undergoing physical therapy for marked deconditioning. No sessions were helpful. She was willing to increase her exercise levels. She ate well. She still practiced proper pandemic precautions. She received her COVID-19 vaccinations and boosters. She still remained anxious concerning COVID-19 infection. Review of systems was otherwise unremarkable. Physical examination displayed stable vital signs. Blood pressure was 133/70. She was an elderly woman in no acute distress, who was alert, cooperative and oriented. She was less anxious. Her skin was unremarkable. Head examination again revealed a small posterior pharyngeal aperture, without temporomandibular joint tenderness. Her lungs were clear to auscultation. Cardiac testing proved unremarkable. I found +1 peripheral pulses without bruits. Her limbs displayed no abnormalities. Neurological examination produced an intact mental status; her anxiety had lessened. Cranial nerve testing was unrevealing. I found no facial weakness or sensory loss on the right. Extraocular movements remained intact. Corneals were present. There were normal tone and bulk, with 5/5 strength throughout. Reflexes were +3 throughout without pathological reflexes. Light touch and vibration continued intact. Finger-to-nose and heel shin testings were performed well. She walked well. Laboratory data included a past unremarkable MRI, except for an enlarged right trigeminal nucleus. Past CMP and CBC were unrevealing.     This individual presents with atypical facial pains, involving the entire right side of her face. These first presented as classical trigeminal neuralgia, despite involving the V1 distribution. She continued responding remarkably well to lower dose oxcarbazepine and baclofen. She will continue that regimen. She again displays no neurological deficits. Her MRI is interesting for an enlarged right trigeminal nucleus, possibly from a viral infection. Repeat MRI showed no changes. Atypical facial pains may also be related to arterial compression of the right trigeminal nerve. If necessary, MRAs of the base of skull will be performed. She still suffers from anxiety from this disorder, which is improving. She is sleeping better at night. Her benign positional vertigo is rarely problematic. She is stable medically, despite her comorbidities. She is deconditioned; I agree with physical therapy sessions and exercising on a daily basis 5 days/week. She will return in 6 months, to see Farrah Bello, ANP. Oxcarbazepine was maintained at 300 mg twice daily and baclofen 10 mg thrice daily. She will now exercise on a regular basis. She will call any time if problems arise. I spent 40 minutes with the patient with over 50 % spent in counseling and disease management. All patient issues were addressed and all questions were answered.

## 2023-05-03 ENCOUNTER — OFFICE VISIT (OUTPATIENT)
Dept: NEUROLOGY | Age: 68
End: 2023-05-03
Payer: MEDICARE

## 2023-05-03 VITALS
HEART RATE: 92 BPM | TEMPERATURE: 98 F | HEIGHT: 62 IN | DIASTOLIC BLOOD PRESSURE: 79 MMHG | SYSTOLIC BLOOD PRESSURE: 130 MMHG | WEIGHT: 150 LBS | BODY MASS INDEX: 27.6 KG/M2

## 2023-05-03 DIAGNOSIS — G50.0 TRIGEMINAL NEURALGIA OF RIGHT SIDE OF FACE: Primary | ICD-10-CM

## 2023-05-03 PROCEDURE — 99215 OFFICE O/P EST HI 40 MIN: CPT | Performed by: NURSE PRACTITIONER

## 2023-05-03 PROCEDURE — 1090F PRES/ABSN URINE INCON ASSESS: CPT | Performed by: NURSE PRACTITIONER

## 2023-05-03 PROCEDURE — G8417 CALC BMI ABV UP PARAM F/U: HCPCS | Performed by: NURSE PRACTITIONER

## 2023-05-03 PROCEDURE — 1036F TOBACCO NON-USER: CPT | Performed by: NURSE PRACTITIONER

## 2023-05-03 PROCEDURE — 3017F COLORECTAL CA SCREEN DOC REV: CPT | Performed by: NURSE PRACTITIONER

## 2023-05-03 PROCEDURE — G8427 DOCREV CUR MEDS BY ELIG CLIN: HCPCS | Performed by: NURSE PRACTITIONER

## 2023-05-03 PROCEDURE — G8400 PT W/DXA NO RESULTS DOC: HCPCS | Performed by: NURSE PRACTITIONER

## 2023-05-03 PROCEDURE — 1123F ACP DISCUSS/DSCN MKR DOCD: CPT | Performed by: NURSE PRACTITIONER

## 2023-05-03 PROCEDURE — G9899 SCRN MAM PERF RSLTS DOC: HCPCS | Performed by: NURSE PRACTITIONER

## 2023-05-03 RX ORDER — CARBAMAZEPINE 100 MG/1
100 TABLET, EXTENDED RELEASE ORAL 2 TIMES DAILY
Qty: 60 TABLET | Refills: 11 | Status: SHIPPED | OUTPATIENT
Start: 2023-05-03

## 2023-05-03 RX ORDER — ONDANSETRON 4 MG/1
4 TABLET, FILM COATED ORAL 2 TIMES DAILY
Qty: 60 TABLET | Refills: 11 | Status: SHIPPED | OUTPATIENT
Start: 2023-05-03 | End: 2024-04-27

## 2023-05-03 NOTE — PROGRESS NOTES
[facility] MSN, APRN-CNP, [address], [address] [place] [address]      [phone]         Office Extended Follow Up:                              Liliya Parra is a 76 y.o. right handed female     We are following for trigeminal neuralgia    She presents alone and is a good historian with an additional significant PMH hiatal hernia, TMJ, vestibular dysfunction, Brooklynn-Barr, fibromyalgia. She is  with one son and is retired from the [employer]. No history of tobacco, EtOH, or illicit drugs. She has no significant neurologic family hx. At the end of 2019/2020 she began feeling brief but severe, stabbing, electrical, lightening,pains along her right forehead and hairline---radiating to her periorbital region. The pains would be so severe she would jerk and could not touch the area. They would occur multiple times per day are often triggered by styling her hair on the side, cold air, and cold water. She states she was unable to open either of her eyes due to the significant pain. She was hospitalized and had an unremarkable MRI of her brain--though Dr. Candida Magallon noted an enlarged R trigeminal nucleus possibly from a viral infection. Sed/CRP were normal. She was given steroids and narcotics as an inpatient. There was no left-sided involvement, autonomic s/s, vision loss or eye pain, or other red flag signs. She said sometime around then she had a \"bad infection in my right eye\" but states \"no swab was done. \" Also around that time, she developed psoriasis on her elbows and knees and had scabs \"all of my head\" and was losing her hair. She denies ever being diagnosed with shingles or herpes. She was started on oxcarbazepine and baclofen. Unfortunately these medications caused marked nausea, loss of appetite, and lethargy--but they resolved her facial pains.   In June of last year, since she was feeling better, she decided to wean off

## 2023-05-09 ENCOUNTER — TELEPHONE (OUTPATIENT)
Dept: NEUROLOGY | Age: 68
End: 2023-05-09

## 2023-06-08 ENCOUNTER — OFFICE VISIT (OUTPATIENT)
Dept: NEUROLOGY | Age: 68
End: 2023-06-08
Payer: MEDICARE

## 2023-06-08 VITALS
WEIGHT: 169 LBS | HEIGHT: 62 IN | DIASTOLIC BLOOD PRESSURE: 100 MMHG | RESPIRATION RATE: 18 BRPM | TEMPERATURE: 98.1 F | HEART RATE: 68 BPM | OXYGEN SATURATION: 96 % | SYSTOLIC BLOOD PRESSURE: 162 MMHG | BODY MASS INDEX: 31.1 KG/M2

## 2023-06-08 DIAGNOSIS — G50.0 TRIGEMINAL NEURALGIA OF RIGHT SIDE OF FACE: Primary | ICD-10-CM

## 2023-06-08 PROCEDURE — 99214 OFFICE O/P EST MOD 30 MIN: CPT | Performed by: NURSE PRACTITIONER

## 2023-06-08 PROCEDURE — G8427 DOCREV CUR MEDS BY ELIG CLIN: HCPCS | Performed by: NURSE PRACTITIONER

## 2023-06-08 PROCEDURE — G8417 CALC BMI ABV UP PARAM F/U: HCPCS | Performed by: NURSE PRACTITIONER

## 2023-06-08 PROCEDURE — G9899 SCRN MAM PERF RSLTS DOC: HCPCS | Performed by: NURSE PRACTITIONER

## 2023-06-08 PROCEDURE — 1123F ACP DISCUSS/DSCN MKR DOCD: CPT | Performed by: NURSE PRACTITIONER

## 2023-06-08 PROCEDURE — 1036F TOBACCO NON-USER: CPT | Performed by: NURSE PRACTITIONER

## 2023-06-08 PROCEDURE — 1090F PRES/ABSN URINE INCON ASSESS: CPT | Performed by: NURSE PRACTITIONER

## 2023-06-08 PROCEDURE — 3017F COLORECTAL CA SCREEN DOC REV: CPT | Performed by: NURSE PRACTITIONER

## 2023-06-08 PROCEDURE — G8400 PT W/DXA NO RESULTS DOC: HCPCS | Performed by: NURSE PRACTITIONER

## 2023-06-08 RX ORDER — POLYETHYLENE GLYCOL 3350 17 G/17G
17 POWDER, FOR SOLUTION ORAL DAILY
Qty: 1530 G | Refills: 3 | Status: SHIPPED | OUTPATIENT
Start: 2023-06-08 | End: 2024-06-02

## 2023-06-08 NOTE — PROGRESS NOTES
compression.     Plan:     -Continue Tegretol  mg BID--premedicate with Zofran before each dose PRN  -Miralax sent per her request  -Await MRA head WO  -Discussed investigating surgical options in the future if meds are failed  -Avoid triggers  -Call with any worsening s/s    RTO in 6 mps or sooner PRN    JAVI Moreno - [facility]  10:29 AM  6/8/2023

## 2023-07-20 ENCOUNTER — TELEPHONE (OUTPATIENT)
Dept: NEUROLOGY | Age: 68
End: 2023-07-20

## 2023-07-20 NOTE — TELEPHONE ENCOUNTER
Pain across the back to the left side worsening - horribly uncomfortable    Since starting tegretol she is not able to take a deep breath and is not able to lay flat.

## 2023-07-20 NOTE — TELEPHONE ENCOUNTER
She told me she was tolerating the Tegretol very well at her last visit and was very pleased with it. I doubt that her breathing issues and issues lying flat are from the Tegretol. I do not want to stop this medication and would recommend increasing it due to her increased pain if she is agreeable.

## 2023-09-01 ENCOUNTER — TELEPHONE (OUTPATIENT)
Dept: NEUROLOGY | Age: 68
End: 2023-09-01

## 2023-09-05 RX ORDER — CARBAMAZEPINE 100 MG/1
100 TABLET, EXTENDED RELEASE ORAL 3 TIMES DAILY
Qty: 270 TABLET | Refills: 3 | Status: SHIPPED | OUTPATIENT
Start: 2023-09-05

## 2023-09-05 NOTE — TELEPHONE ENCOUNTER
Lou Fothergill was notified of medication increase. She said she will need a new prescription sent to her pharmacy for the increase.

## 2023-10-03 ENCOUNTER — OFFICE VISIT (OUTPATIENT)
Dept: NEUROLOGY | Age: 68
End: 2023-10-03
Payer: MEDICARE

## 2023-10-03 VITALS
OXYGEN SATURATION: 95 % | HEART RATE: 94 BPM | TEMPERATURE: 97.2 F | BODY MASS INDEX: 30.18 KG/M2 | WEIGHT: 165 LBS | DIASTOLIC BLOOD PRESSURE: 82 MMHG | SYSTOLIC BLOOD PRESSURE: 147 MMHG

## 2023-10-03 DIAGNOSIS — G50.0 TRIGEMINAL NEURALGIA OF RIGHT SIDE OF FACE: Primary | ICD-10-CM

## 2023-10-03 PROCEDURE — 1036F TOBACCO NON-USER: CPT | Performed by: NURSE PRACTITIONER

## 2023-10-03 PROCEDURE — G8417 CALC BMI ABV UP PARAM F/U: HCPCS | Performed by: NURSE PRACTITIONER

## 2023-10-03 PROCEDURE — G8484 FLU IMMUNIZE NO ADMIN: HCPCS | Performed by: NURSE PRACTITIONER

## 2023-10-03 PROCEDURE — 1090F PRES/ABSN URINE INCON ASSESS: CPT | Performed by: NURSE PRACTITIONER

## 2023-10-03 PROCEDURE — 3017F COLORECTAL CA SCREEN DOC REV: CPT | Performed by: NURSE PRACTITIONER

## 2023-10-03 PROCEDURE — G8400 PT W/DXA NO RESULTS DOC: HCPCS | Performed by: NURSE PRACTITIONER

## 2023-10-03 PROCEDURE — G8427 DOCREV CUR MEDS BY ELIG CLIN: HCPCS | Performed by: NURSE PRACTITIONER

## 2023-10-03 PROCEDURE — 1123F ACP DISCUSS/DSCN MKR DOCD: CPT | Performed by: NURSE PRACTITIONER

## 2023-10-03 PROCEDURE — G9899 SCRN MAM PERF RSLTS DOC: HCPCS | Performed by: NURSE PRACTITIONER

## 2023-10-03 PROCEDURE — 99214 OFFICE O/P EST MOD 30 MIN: CPT | Performed by: NURSE PRACTITIONER

## 2023-10-03 RX ORDER — MELOXICAM 15 MG/1
TABLET ORAL
COMMUNITY
Start: 2023-10-02

## 2024-02-12 NOTE — PROGRESS NOTES
Patient: Justine Viera    Procedure: Procedure(s):  ESOPHAGOGASTRODUODENOSCOPY with biopsies       Diagnosis: Dysphagia [R13.10]  Globus sensation [R09.A2]  Diagnosis Additional Information: No value filed.    Anesthesia Type:   MAC     Note:    Oropharynx: oropharynx clear of all foreign objects and spontaneously breathing  Level of Consciousness: awake  Oxygen Supplementation: room air    Independent Airway: airway patency satisfactory and stable  Dentition: dentition unchanged  Vital Signs Stable: post-procedure vital signs reviewed and stable  Report to RN Given: handoff report given  Patient transferred to: Phase II    Handoff Report: Identifed the Patient, Identified the Reponsible Provider, Reviewed the pertinent medical history, Discussed the surgical course, Reviewed Intra-OP anesthesia mangement and issues during anesthesia, Set expectations for post-procedure period and Allowed opportunity for questions and acknowledgement of understanding      Vitals:  Vitals Value Taken Time   /53 02/12/24 1052   Temp 36.1  C (97  F) 02/12/24 1050   Pulse 78 02/12/24 1054   Resp 16 02/12/24 1050   SpO2 94 % 02/12/24 1054   Vitals shown include unfiled device data.    Electronically Signed By: JONATAN Krishnan CRNA  February 12, 2024  10:55 AM   Called report to Esau Sifuentes on 5S. RN informed that patient will be coming downstairs with 1 home medication.     Carole Wilkes RN

## 2024-06-18 ENCOUNTER — TELEPHONE (OUTPATIENT)
Dept: NEUROLOGY | Age: 69
End: 2024-06-18

## 2024-06-18 NOTE — TELEPHONE ENCOUNTER
[facility] Message        [phone]  From: Ptn  RE: Kandicesg Damon  Patient's Doctor: Dr. Dwight Love  1955  Ptn would like to make an appointment. Also they need a new prescription and also they were supposed to have a test done, but it was never made because order was never sent for MRA.

## 2024-08-28 ENCOUNTER — OFFICE VISIT (OUTPATIENT)
Dept: NEUROLOGY | Age: 69
End: 2024-08-28
Payer: MEDICARE

## 2024-08-28 VITALS
TEMPERATURE: 97.6 F | WEIGHT: 169 LBS | DIASTOLIC BLOOD PRESSURE: 71 MMHG | OXYGEN SATURATION: 95 % | SYSTOLIC BLOOD PRESSURE: 138 MMHG | BODY MASS INDEX: 30.91 KG/M2 | HEART RATE: 88 BPM

## 2024-08-28 DIAGNOSIS — G50.0 TRIGEMINAL NEURALGIA OF RIGHT SIDE OF FACE: Primary | ICD-10-CM

## 2024-08-28 PROCEDURE — 1123F ACP DISCUSS/DSCN MKR DOCD: CPT | Performed by: NURSE PRACTITIONER

## 2024-08-28 PROCEDURE — G8417 CALC BMI ABV UP PARAM F/U: HCPCS | Performed by: NURSE PRACTITIONER

## 2024-08-28 PROCEDURE — 3017F COLORECTAL CA SCREEN DOC REV: CPT | Performed by: NURSE PRACTITIONER

## 2024-08-28 PROCEDURE — 1090F PRES/ABSN URINE INCON ASSESS: CPT | Performed by: NURSE PRACTITIONER

## 2024-08-28 PROCEDURE — G8400 PT W/DXA NO RESULTS DOC: HCPCS | Performed by: NURSE PRACTITIONER

## 2024-08-28 PROCEDURE — 99214 OFFICE O/P EST MOD 30 MIN: CPT | Performed by: NURSE PRACTITIONER

## 2024-08-28 PROCEDURE — G8427 DOCREV CUR MEDS BY ELIG CLIN: HCPCS | Performed by: NURSE PRACTITIONER

## 2024-08-28 PROCEDURE — 1036F TOBACCO NON-USER: CPT | Performed by: NURSE PRACTITIONER

## 2024-08-28 RX ORDER — ONDANSETRON 4 MG/1
TABLET, FILM COATED ORAL
COMMUNITY
Start: 2024-08-22

## 2024-08-28 NOTE — PROGRESS NOTES
[facility] NEUROLOGY   Dwight Love MSN, APRN-CNP, [facility]    [address] [place] [address]      [phone]            Office Follow Up:                           Kandice Damon is a 69 y.o. right handed female     We are following for trigeminal neuralgia    She presents alone and is a good historian. Last seen in Oct 2023    She continues to do very well on her current dose of carbamazepine 100 mg TID. She is careful to avoid triggers but is able to manage her personal hygiene well and eat without issues. The carbamazepine constipates her and Miralax helps. She had MRA head at [facility] but no report was sent.     Zofran is available    No other complaints today    No chest pain or palpitations  No SOB  No vertigo, lightheadedness or loss of consciousness  No falls, tripping or stumbling  No incontinence of bowels or bladder  No itching or bruising appreciated  No numbness, tingling or focal arm/leg weakness  No speech or swallowing problems    ROS otherwise negative      Current Outpatient Medications   Medication Sig Dispense Refill    ondansetron (ZOFRAN) 4 MG tablet       TURMERIC PO Take by mouth      carBAMazepine (TEGRETOL-XR) 100 MG extended release tablet Take 1 tablet by mouth in the morning, at noon, and at bedtime 270 tablet 3    polyethylene glycol (GLYCOLAX) 17 GM/SCOOP powder Take 17 g by mouth daily as needed (constipation) 1530 g 3    meloxicam (MOBIC) 15 MG tablet       multivitamin-iron-minerals-folic acid (CENTRUM) chewable tablet Take 1 tablet by mouth daily      ergocalciferol (ERGOCALCIFEROL) 1.25 MG (01657 UT) capsule Take 1 capsule by mouth once a week      dexlansoprazole (DEXILANT) 60 MG CPDR delayed release capsule Take 1 capsule by mouth daily       No current facility-administered medications for this visit.      Objective:     /71 (Site: Right Upper Arm, Position: Sitting)   Pulse 88   Temp 97.6 °F (36.4 °C)   Wt 76.7 kg (169 lb)   SpO2 95%   BMI 30.91 kg/m²

## 2024-09-18 ENCOUNTER — HOSPITAL ENCOUNTER (OUTPATIENT)
Age: 69
Setting detail: OBSERVATION
Discharge: HOME | End: 2024-09-19
Attending: EMERGENCY MEDICINE | Admitting: STUDENT IN AN ORGANIZED HEALTH CARE EDUCATION/TRAINING PROGRAM
Payer: MEDICARE

## 2024-09-18 ENCOUNTER — APPOINTMENT (OUTPATIENT)
Dept: GENERAL RADIOLOGY | Age: 69
End: 2024-09-18
Payer: MEDICARE

## 2024-09-18 DIAGNOSIS — R07.9 CHEST PAIN, UNSPECIFIED TYPE: Primary | ICD-10-CM

## 2024-09-18 DIAGNOSIS — R07.2 PRECORDIAL PAIN: ICD-10-CM

## 2024-09-18 LAB
ALBUMIN SERPL-MCNC: 4.3 G/DL (ref 3.5–5.2)
ALP SERPL-CCNC: 133 U/L (ref 35–104)
ALT SERPL-CCNC: 19 U/L (ref 0–32)
ANION GAP SERPL CALCULATED.3IONS-SCNC: 13 MMOL/L (ref 7–16)
AST SERPL-CCNC: 21 U/L (ref 0–31)
BASOPHILS # BLD: 0.06 K/UL (ref 0–0.2)
BASOPHILS NFR BLD: 1 % (ref 0–2)
BILIRUB SERPL-MCNC: 0.4 MG/DL (ref 0–1.2)
BNP SERPL-MCNC: 71 PG/ML (ref 0–125)
BUN SERPL-MCNC: 13 MG/DL (ref 6–23)
CALCIUM SERPL-MCNC: 8.8 MG/DL (ref 8.6–10.2)
CHLORIDE SERPL-SCNC: 101 MMOL/L (ref 98–107)
CO2 SERPL-SCNC: 24 MMOL/L (ref 22–29)
CREAT SERPL-MCNC: 0.7 MG/DL (ref 0.5–1)
EKG ATRIAL RATE: 75 BPM
EKG P AXIS: 18 DEGREES
EKG P-R INTERVAL: 112 MS
EKG Q-T INTERVAL: 396 MS
EKG QRS DURATION: 76 MS
EKG QTC CALCULATION (BAZETT): 442 MS
EKG R AXIS: 9 DEGREES
EKG T AXIS: 126 DEGREES
EKG VENTRICULAR RATE: 75 BPM
EOSINOPHIL # BLD: 0.19 K/UL (ref 0.05–0.5)
EOSINOPHILS RELATIVE PERCENT: 3 % (ref 0–6)
ERYTHROCYTE [DISTWIDTH] IN BLOOD BY AUTOMATED COUNT: 13.4 % (ref 11.5–15)
GFR, ESTIMATED: >90 ML/MIN/1.73M2
GLUCOSE SERPL-MCNC: 87 MG/DL (ref 74–99)
HCT VFR BLD AUTO: 40 % (ref 34–48)
HGB BLD-MCNC: 13.3 G/DL (ref 11.5–15.5)
IMM GRANULOCYTES # BLD AUTO: 0.03 K/UL (ref 0–0.58)
IMM GRANULOCYTES NFR BLD: 0 % (ref 0–5)
LYMPHOCYTES NFR BLD: 1.29 K/UL (ref 1.5–4)
LYMPHOCYTES RELATIVE PERCENT: 18 % (ref 20–42)
MAGNESIUM SERPL-MCNC: 2.2 MG/DL (ref 1.6–2.6)
MCH RBC QN AUTO: 27.1 PG (ref 26–35)
MCHC RBC AUTO-ENTMCNC: 33.3 G/DL (ref 32–34.5)
MCV RBC AUTO: 81.5 FL (ref 80–99.9)
MONOCYTES NFR BLD: 0.65 K/UL (ref 0.1–0.95)
MONOCYTES NFR BLD: 9 % (ref 2–12)
NEUTROPHILS NFR BLD: 70 % (ref 43–80)
NEUTS SEG NFR BLD: 5.12 K/UL (ref 1.8–7.3)
PLATELET # BLD AUTO: 250 K/UL (ref 130–450)
PMV BLD AUTO: 9.5 FL (ref 7–12)
POTASSIUM SERPL-SCNC: 4.1 MMOL/L (ref 3.5–5)
PROT SERPL-MCNC: 6.6 G/DL (ref 6.4–8.3)
RBC # BLD AUTO: 4.91 M/UL (ref 3.5–5.5)
SODIUM SERPL-SCNC: 138 MMOL/L (ref 132–146)
TROPONIN I SERPL HS-MCNC: 13 NG/L (ref 0–9)
TROPONIN I SERPL HS-MCNC: 14 NG/L (ref 0–9)
WBC OTHER # BLD: 7.3 K/UL (ref 4.5–11.5)

## 2024-09-18 PROCEDURE — 93005 ELECTROCARDIOGRAM TRACING: CPT | Performed by: EMERGENCY MEDICINE

## 2024-09-18 PROCEDURE — G0378 HOSPITAL OBSERVATION PER HR: HCPCS

## 2024-09-18 PROCEDURE — 99223 1ST HOSP IP/OBS HIGH 75: CPT | Performed by: STUDENT IN AN ORGANIZED HEALTH CARE EDUCATION/TRAINING PROGRAM

## 2024-09-18 PROCEDURE — 2580000003 HC RX 258: Performed by: EMERGENCY MEDICINE

## 2024-09-18 PROCEDURE — 99285 EMERGENCY DEPT VISIT HI MDM: CPT

## 2024-09-18 PROCEDURE — 71045 X-RAY EXAM CHEST 1 VIEW: CPT

## 2024-09-18 PROCEDURE — 83880 ASSAY OF NATRIURETIC PEPTIDE: CPT

## 2024-09-18 PROCEDURE — 2580000003 HC RX 258: Performed by: STUDENT IN AN ORGANIZED HEALTH CARE EDUCATION/TRAINING PROGRAM

## 2024-09-18 PROCEDURE — 83735 ASSAY OF MAGNESIUM: CPT

## 2024-09-18 PROCEDURE — 84484 ASSAY OF TROPONIN QUANT: CPT

## 2024-09-18 PROCEDURE — 93010 ELECTROCARDIOGRAM REPORT: CPT | Performed by: INTERNAL MEDICINE

## 2024-09-18 PROCEDURE — 80053 COMPREHEN METABOLIC PANEL: CPT

## 2024-09-18 PROCEDURE — 6370000000 HC RX 637 (ALT 250 FOR IP): Performed by: STUDENT IN AN ORGANIZED HEALTH CARE EDUCATION/TRAINING PROGRAM

## 2024-09-18 PROCEDURE — 85025 COMPLETE CBC W/AUTO DIFF WBC: CPT

## 2024-09-18 RX ORDER — ONDANSETRON 2 MG/ML
4 INJECTION INTRAMUSCULAR; INTRAVENOUS EVERY 6 HOURS PRN
Status: DISCONTINUED | OUTPATIENT
Start: 2024-09-18 | End: 2024-09-19 | Stop reason: HOSPADM

## 2024-09-18 RX ORDER — POTASSIUM CHLORIDE 1500 MG/1
40 TABLET, EXTENDED RELEASE ORAL PRN
Status: DISCONTINUED | OUTPATIENT
Start: 2024-09-18 | End: 2024-09-19 | Stop reason: HOSPADM

## 2024-09-18 RX ORDER — POTASSIUM CHLORIDE 7.45 MG/ML
10 INJECTION INTRAVENOUS PRN
Status: DISCONTINUED | OUTPATIENT
Start: 2024-09-18 | End: 2024-09-19 | Stop reason: HOSPADM

## 2024-09-18 RX ORDER — ACETAMINOPHEN 650 MG/1
650 SUPPOSITORY RECTAL EVERY 6 HOURS PRN
Status: DISCONTINUED | OUTPATIENT
Start: 2024-09-18 | End: 2024-09-19 | Stop reason: HOSPADM

## 2024-09-18 RX ORDER — SODIUM CHLORIDE 0.9 % (FLUSH) 0.9 %
5-40 SYRINGE (ML) INJECTION PRN
Status: DISCONTINUED | OUTPATIENT
Start: 2024-09-18 | End: 2024-09-19 | Stop reason: HOSPADM

## 2024-09-18 RX ORDER — POLYETHYLENE GLYCOL 3350 17 G/17G
17 POWDER, FOR SOLUTION ORAL DAILY PRN
Status: DISCONTINUED | OUTPATIENT
Start: 2024-09-18 | End: 2024-09-19 | Stop reason: HOSPADM

## 2024-09-18 RX ORDER — VIT C/B6/B5/MAGNESIUM/HERB 173 50-5-6-5MG
500 CAPSULE ORAL DAILY
COMMUNITY

## 2024-09-18 RX ORDER — SODIUM CHLORIDE 0.9 % (FLUSH) 0.9 %
5-40 SYRINGE (ML) INJECTION EVERY 12 HOURS SCHEDULED
Status: DISCONTINUED | OUTPATIENT
Start: 2024-09-18 | End: 2024-09-19 | Stop reason: HOSPADM

## 2024-09-18 RX ORDER — ACETAMINOPHEN 325 MG/1
650 TABLET ORAL EVERY 6 HOURS PRN
Status: DISCONTINUED | OUTPATIENT
Start: 2024-09-18 | End: 2024-09-19 | Stop reason: HOSPADM

## 2024-09-18 RX ORDER — CARBAMAZEPINE 100 MG/1
100 TABLET, EXTENDED RELEASE ORAL 3 TIMES DAILY
Status: DISCONTINUED | OUTPATIENT
Start: 2024-09-18 | End: 2024-09-19 | Stop reason: HOSPADM

## 2024-09-18 RX ORDER — MAGNESIUM SULFATE IN WATER 40 MG/ML
2000 INJECTION, SOLUTION INTRAVENOUS PRN
Status: DISCONTINUED | OUTPATIENT
Start: 2024-09-18 | End: 2024-09-19 | Stop reason: HOSPADM

## 2024-09-18 RX ORDER — ONDANSETRON 4 MG/1
4 TABLET, ORALLY DISINTEGRATING ORAL EVERY 8 HOURS PRN
Status: DISCONTINUED | OUTPATIENT
Start: 2024-09-18 | End: 2024-09-19 | Stop reason: HOSPADM

## 2024-09-18 RX ORDER — ENOXAPARIN SODIUM 100 MG/ML
40 INJECTION SUBCUTANEOUS DAILY
Status: DISCONTINUED | OUTPATIENT
Start: 2024-09-18 | End: 2024-09-19 | Stop reason: HOSPADM

## 2024-09-18 RX ORDER — CARBAMAZEPINE 100 MG/1
100 TABLET, EXTENDED RELEASE ORAL 3 TIMES DAILY
COMMUNITY

## 2024-09-18 RX ORDER — SODIUM CHLORIDE 9 MG/ML
INJECTION, SOLUTION INTRAVENOUS PRN
Status: DISCONTINUED | OUTPATIENT
Start: 2024-09-18 | End: 2024-09-19 | Stop reason: HOSPADM

## 2024-09-18 RX ORDER — POLYETHYLENE GLYCOL 3350 17 G/17G
17 POWDER, FOR SOLUTION ORAL NIGHTLY
COMMUNITY

## 2024-09-18 RX ORDER — 0.9 % SODIUM CHLORIDE 0.9 %
1000 INTRAVENOUS SOLUTION INTRAVENOUS ONCE
Status: COMPLETED | OUTPATIENT
Start: 2024-09-18 | End: 2024-09-18

## 2024-09-18 RX ORDER — ASPIRIN 81 MG/1
324 TABLET, CHEWABLE ORAL ONCE
Status: DISCONTINUED | OUTPATIENT
Start: 2024-09-18 | End: 2024-09-19 | Stop reason: HOSPADM

## 2024-09-18 RX ORDER — PANTOPRAZOLE SODIUM 40 MG/1
40 TABLET, DELAYED RELEASE ORAL
Status: DISCONTINUED | OUTPATIENT
Start: 2024-09-19 | End: 2024-09-19 | Stop reason: HOSPADM

## 2024-09-18 RX ADMIN — CARBAMAZEPINE 100 MG: 100 TABLET, EXTENDED RELEASE ORAL at 21:02

## 2024-09-18 RX ADMIN — SODIUM CHLORIDE, PRESERVATIVE FREE 10 ML: 5 INJECTION INTRAVENOUS at 21:02

## 2024-09-18 RX ADMIN — SODIUM CHLORIDE 1000 ML: 9 INJECTION, SOLUTION INTRAVENOUS at 14:46

## 2024-09-18 ASSESSMENT — LIFESTYLE VARIABLES
HOW MANY STANDARD DRINKS CONTAINING ALCOHOL DO YOU HAVE ON A TYPICAL DAY: PATIENT DOES NOT DRINK
HOW OFTEN DO YOU HAVE A DRINK CONTAINING ALCOHOL: NEVER

## 2024-09-19 ENCOUNTER — APPOINTMENT (OUTPATIENT)
Age: 69
End: 2024-09-19
Attending: INTERNAL MEDICINE
Payer: MEDICARE

## 2024-09-19 ENCOUNTER — APPOINTMENT (OUTPATIENT)
Dept: NUCLEAR MEDICINE | Age: 69
End: 2024-09-19
Attending: INTERNAL MEDICINE
Payer: MEDICARE

## 2024-09-19 VITALS
SYSTOLIC BLOOD PRESSURE: 95 MMHG | BODY MASS INDEX: 31.28 KG/M2 | TEMPERATURE: 97.3 F | OXYGEN SATURATION: 95 % | WEIGHT: 170 LBS | DIASTOLIC BLOOD PRESSURE: 80 MMHG | HEIGHT: 62 IN | RESPIRATION RATE: 18 BRPM | HEART RATE: 88 BPM

## 2024-09-19 PROBLEM — E66.9 MODERATE OBESITY: Status: ACTIVE | Noted: 2024-09-19

## 2024-09-19 PROBLEM — E66.8 MODERATE OBESITY: Status: ACTIVE | Noted: 2024-09-19

## 2024-09-19 PROBLEM — R07.89 ATYPICAL CHEST PAIN: Status: ACTIVE | Noted: 2024-09-18

## 2024-09-19 LAB
CHOLEST SERPL-MCNC: 226 MG/DL
ECHO BSA: 1.84 M2
HBA1C MFR BLD: 5.9 % (ref 4–5.6)
HDLC SERPL-MCNC: 48 MG/DL
LDLC SERPL CALC-MCNC: 149 MG/DL
STRESS BASELINE DIAS BP: 43 MMHG
STRESS BASELINE HR: 68 BPM
STRESS BASELINE SYS BP: 152 MMHG
STRESS ESTIMATED WORKLOAD: 1 METS
STRESS PEAK DIAS BP: 43 MMHG
STRESS PEAK SYS BP: 152 MMHG
STRESS PERCENT HR ACHIEVED: 72 %
STRESS POST PEAK HR: 109 BPM
STRESS RATE PRESSURE PRODUCT: NORMAL BPM*MMHG
STRESS TARGET HR: 151 BPM
TRIGL SERPL-MCNC: 143 MG/DL
TSH SERPL DL<=0.05 MIU/L-ACNC: 1.28 UIU/ML (ref 0.27–4.2)
VLDLC SERPL CALC-MCNC: 29 MG/DL

## 2024-09-19 PROCEDURE — G0378 HOSPITAL OBSERVATION PER HR: HCPCS

## 2024-09-19 PROCEDURE — 99223 1ST HOSP IP/OBS HIGH 75: CPT | Performed by: INTERNAL MEDICINE

## 2024-09-19 PROCEDURE — 93017 CV STRESS TEST TRACING ONLY: CPT

## 2024-09-19 PROCEDURE — A9500 TC99M SESTAMIBI: HCPCS | Performed by: RADIOLOGY

## 2024-09-19 PROCEDURE — 2580000003 HC RX 258: Performed by: STUDENT IN AN ORGANIZED HEALTH CARE EDUCATION/TRAINING PROGRAM

## 2024-09-19 PROCEDURE — 84443 ASSAY THYROID STIM HORMONE: CPT

## 2024-09-19 PROCEDURE — 6360000002 HC RX W HCPCS: Performed by: INTERNAL MEDICINE

## 2024-09-19 PROCEDURE — 83036 HEMOGLOBIN GLYCOSYLATED A1C: CPT

## 2024-09-19 PROCEDURE — 80061 LIPID PANEL: CPT

## 2024-09-19 PROCEDURE — 3430000000 HC RX DIAGNOSTIC RADIOPHARMACEUTICAL: Performed by: RADIOLOGY

## 2024-09-19 PROCEDURE — 6370000000 HC RX 637 (ALT 250 FOR IP): Performed by: STUDENT IN AN ORGANIZED HEALTH CARE EDUCATION/TRAINING PROGRAM

## 2024-09-19 PROCEDURE — 78452 HT MUSCLE IMAGE SPECT MULT: CPT

## 2024-09-19 RX ORDER — ATORVASTATIN CALCIUM 40 MG/1
40 TABLET, FILM COATED ORAL DAILY
Qty: 30 TABLET | Refills: 3 | Status: SHIPPED | OUTPATIENT
Start: 2024-09-19 | End: 2024-09-19 | Stop reason: HOSPADM

## 2024-09-19 RX ORDER — TETRAKIS(2-METHOXYISOBUTYLISOCYANIDE)COPPER(I) TETRAFLUOROBORATE 1 MG/ML
30 INJECTION, POWDER, LYOPHILIZED, FOR SOLUTION INTRAVENOUS
Status: COMPLETED | OUTPATIENT
Start: 2024-09-19 | End: 2024-09-19

## 2024-09-19 RX ORDER — TETRAKIS(2-METHOXYISOBUTYLISOCYANIDE)COPPER(I) TETRAFLUOROBORATE 1 MG/ML
10 INJECTION, POWDER, LYOPHILIZED, FOR SOLUTION INTRAVENOUS
Status: COMPLETED | OUTPATIENT
Start: 2024-09-19 | End: 2024-09-19

## 2024-09-19 RX ORDER — ATORVASTATIN CALCIUM 40 MG/1
40 TABLET, FILM COATED ORAL DAILY
Qty: 30 TABLET | Refills: 3 | Status: SHIPPED | OUTPATIENT
Start: 2024-09-19

## 2024-09-19 RX ORDER — REGADENOSON 0.08 MG/ML
0.4 INJECTION, SOLUTION INTRAVENOUS
Status: COMPLETED | OUTPATIENT
Start: 2024-09-19 | End: 2024-09-19

## 2024-09-19 RX ADMIN — REGADENOSON 0.4 MG: 0.08 INJECTION, SOLUTION INTRAVENOUS at 09:40

## 2024-09-19 RX ADMIN — CARBAMAZEPINE 100 MG: 100 TABLET, EXTENDED RELEASE ORAL at 13:02

## 2024-09-19 RX ADMIN — Medication 30 MILLICURIE: at 07:58

## 2024-09-19 RX ADMIN — Medication 10 MILLICURIE: at 07:55

## 2024-09-19 RX ADMIN — SODIUM CHLORIDE, PRESERVATIVE FREE 10 ML: 5 INJECTION INTRAVENOUS at 14:14

## 2024-09-19 ASSESSMENT — PAIN SCALES - GENERAL
PAINLEVEL_OUTOF10: 0
PAINLEVEL_OUTOF10: 0

## 2025-02-14 ENCOUNTER — TELEPHONE (OUTPATIENT)
Dept: NEUROLOGY | Age: 70
End: 2025-02-14

## 2025-02-14 NOTE — TELEPHONE ENCOUNTER
Severe shocking pain right side of head, she is taking her medication as directed. She wants to know what she can do, if she should increase her medication. She scheduled a follow up but it's in March. She does not want to come to the [place] office.     Pt was informed her provider is off and we will call her on Monday. She said that was ok.

## 2025-02-14 NOTE — TELEPHONE ENCOUNTER
Patient called in and stated that her neuralgia is worsening and would like to know if she can increase her medication.  She is also asking to schedule her follow up since the schedule wasn't available at her last appointment.

## 2025-02-18 ENCOUNTER — TELEPHONE (OUTPATIENT)
Dept: NEUROLOGY | Age: 70
End: 2025-02-18

## 2025-02-18 RX ORDER — CARBAMAZEPINE 100 MG/1
200 TABLET, EXTENDED RELEASE ORAL 3 TIMES DAILY
Qty: 540 TABLET | Refills: 3 | Status: SHIPPED | OUTPATIENT
Start: 2025-02-18 | End: 2026-02-13

## 2025-02-18 NOTE — TELEPHONE ENCOUNTER
Yes, she can take the 200 mg of carbamazepine with the steroid as long as it doesn't make her too nauseous--she can use her Zofran before each dose.  If she cannot tolerate the 200 mg, she can go back to 100 mg but take it 4 times a day instead of 3 times a day

## 2025-02-18 NOTE — TELEPHONE ENCOUNTER
Notified pt that she take the 200mg of carbamazepine with steroid as long as doesn't  make her to nauseous- she can take the Zofran before dose > Instructed her if she does not tolerate it she can go back to 100mg qid instead of tid.      Pt is also requesting that a new prescription be sent in for Carbamazepine 100mg tablets (Two ) tablets tid to [facility]. She wanted this does in case she would have to decrease it to 100mg 4 x daily.    yes

## 2025-02-18 NOTE — TELEPHONE ENCOUNTER
Patient's PCP ordered her a steroid pack    Asking if she can take that with the increased carbamazepine    Will it be safe to go up that fast at one time due to her sensitivity as well?    Is 6 at once with the steroid too much?     Or should she not take the steroid?

## 2025-03-26 ENCOUNTER — OFFICE VISIT (OUTPATIENT)
Dept: NEUROLOGY | Age: 70
End: 2025-03-26
Payer: MEDICARE

## 2025-03-26 VITALS
TEMPERATURE: 97.8 F | DIASTOLIC BLOOD PRESSURE: 59 MMHG | BODY MASS INDEX: 31.09 KG/M2 | OXYGEN SATURATION: 96 % | SYSTOLIC BLOOD PRESSURE: 132 MMHG | WEIGHT: 170 LBS | HEART RATE: 77 BPM

## 2025-03-26 DIAGNOSIS — G50.0 TRIGEMINAL NEURALGIA OF RIGHT SIDE OF FACE: Primary | ICD-10-CM

## 2025-03-26 DIAGNOSIS — R46.7 VERBOSITY AND CIRCUMSTANTIAL DETAIL OBSCURING REASON FOR CONTACT: ICD-10-CM

## 2025-03-26 PROBLEM — R07.89 ATYPICAL CHEST PAIN: Status: RESOLVED | Noted: 2024-09-18 | Resolved: 2025-03-26

## 2025-03-26 PROCEDURE — G8427 DOCREV CUR MEDS BY ELIG CLIN: HCPCS | Performed by: NURSE PRACTITIONER

## 2025-03-26 PROCEDURE — 3017F COLORECTAL CA SCREEN DOC REV: CPT | Performed by: NURSE PRACTITIONER

## 2025-03-26 PROCEDURE — 1036F TOBACCO NON-USER: CPT | Performed by: NURSE PRACTITIONER

## 2025-03-26 PROCEDURE — 1159F MED LIST DOCD IN RCRD: CPT | Performed by: NURSE PRACTITIONER

## 2025-03-26 PROCEDURE — 99214 OFFICE O/P EST MOD 30 MIN: CPT | Performed by: NURSE PRACTITIONER

## 2025-03-26 PROCEDURE — G8400 PT W/DXA NO RESULTS DOC: HCPCS | Performed by: NURSE PRACTITIONER

## 2025-03-26 PROCEDURE — G8417 CALC BMI ABV UP PARAM F/U: HCPCS | Performed by: NURSE PRACTITIONER

## 2025-03-26 PROCEDURE — 1123F ACP DISCUSS/DSCN MKR DOCD: CPT | Performed by: NURSE PRACTITIONER

## 2025-03-26 PROCEDURE — 1090F PRES/ABSN URINE INCON ASSESS: CPT | Performed by: NURSE PRACTITIONER

## 2025-03-26 RX ORDER — PREDNISONE 20 MG/1
20 TABLET ORAL DAILY
Qty: 5 TABLET | Refills: 0 | Status: SHIPPED | OUTPATIENT
Start: 2025-03-26 | End: 2025-03-31

## 2025-03-26 RX ORDER — CARBAMAZEPINE 100 MG/1
100 TABLET, EXTENDED RELEASE ORAL 4 TIMES DAILY
Qty: 480 TABLET | Refills: 3 | Status: SHIPPED | OUTPATIENT
Start: 2025-03-26

## 2025-03-26 NOTE — PROGRESS NOTES
[facility] NEUROLOGY   Dwight Love MSN, APRN-CNP, [facility]    [address] [place] [address] [phone]            Office Follow Up:                           Kandice Damon is a 70 y.o. right handed female     We are following for trigeminal neuralgia    She presents alone and is a good historian.     In February she had a severe flareup of her right facial pains.  PCP gave her some steroids to help quell her pain and she was advised to increase her carbamazepine 100 mg to 4 times daily which has helped. She is unable to tolerate 200 mg dosing due to dizziness.  She is rarely using Zofran.  She uses MiraLAX daily for associated constipation    She is concerned that a B12 deficiency is contributing to her facial pains and has bought some OTC supplements.  She would like her levels checked.    She has been trying to avoid triggers, but she lives in fear that her facial pains will return.    No chest pain or palpitations  No SOB  No vertigo, lightheadedness or loss of consciousness  No falls, tripping or stumbling  No incontinence of bowels or bladder  No itching or bruising appreciated  No numbness, tingling or focal arm/leg weakness  No speech or swallowing problems    ROS otherwise negative      Current Outpatient Medications   Medication Sig Dispense Refill    carBAMazepine (TEGRETOL XR) 100 MG extended release tablet Take 2 tablets by mouth 3 times daily 540 tablet 3    Polyethylene Glycol 400 LIQD by Does not apply route      atorvastatin (LIPITOR) 40 MG tablet Take 1 tablet by mouth daily 30 tablet 3    polyethylene glycol (GLYCOLAX) 17 GM/SCOOP powder Take 17 g by mouth nightly      turmeric 500 MG CAPS Take 1 capsule by mouth daily      ondansetron (ZOFRAN) 4 MG tablet Take 1 tablet by mouth every 8 hours as needed for Nausea or Vomiting      multivitamin-iron-minerals-folic acid (CENTRUM) chewable tablet Take 1 tablet by mouth three times a week      ergocalciferol (ERGOCALCIFEROL) 1.25 MG

## 2025-08-29 ENCOUNTER — TELEPHONE (OUTPATIENT)
Dept: ADMINISTRATIVE | Age: 70
End: 2025-08-29